# Patient Record
Sex: FEMALE | Race: WHITE | NOT HISPANIC OR LATINO | Employment: OTHER | URBAN - METROPOLITAN AREA
[De-identification: names, ages, dates, MRNs, and addresses within clinical notes are randomized per-mention and may not be internally consistent; named-entity substitution may affect disease eponyms.]

---

## 2017-03-13 ENCOUNTER — TELEPHONE (OUTPATIENT)
Dept: FAMILY MEDICINE | Facility: CLINIC | Age: 67
End: 2017-03-13

## 2017-04-09 DIAGNOSIS — Z76.0 ENCOUNTER FOR MEDICATION REFILL: ICD-10-CM

## 2017-04-10 RX ORDER — ATORVASTATIN CALCIUM 20 MG/1
TABLET, FILM COATED ORAL
Qty: 90 TABLET | Refills: 1 | Status: SHIPPED | OUTPATIENT
Start: 2017-04-10 | End: 2017-04-17

## 2017-04-17 ENCOUNTER — OFFICE VISIT (OUTPATIENT)
Dept: FAMILY MEDICINE | Facility: CLINIC | Age: 67
End: 2017-04-17

## 2017-04-17 VITALS
DIASTOLIC BLOOD PRESSURE: 80 MMHG | HEIGHT: 66 IN | HEART RATE: 73 BPM | SYSTOLIC BLOOD PRESSURE: 136 MMHG | BODY MASS INDEX: 25.55 KG/M2 | WEIGHT: 159 LBS | OXYGEN SATURATION: 98 %

## 2017-04-17 DIAGNOSIS — E78.00 PURE HYPERCHOLESTEROLEMIA: ICD-10-CM

## 2017-04-17 DIAGNOSIS — I10 HYPERTENSION GOAL BP (BLOOD PRESSURE) < 140/80: Primary | ICD-10-CM

## 2017-04-17 DIAGNOSIS — M35.3 PMR (POLYMYALGIA RHEUMATICA) (H): ICD-10-CM

## 2017-04-17 DIAGNOSIS — Z11.59 NEED FOR HEPATITIS C SCREENING TEST: ICD-10-CM

## 2017-04-17 DIAGNOSIS — M85.80 OSTEOPENIA: ICD-10-CM

## 2017-04-17 LAB — ERYTHROCYTE [SEDIMENTATION RATE] IN BLOOD: 18 MM/HR (ref 0–20)

## 2017-04-17 PROCEDURE — 36415 COLL VENOUS BLD VENIPUNCTURE: CPT | Performed by: FAMILY MEDICINE

## 2017-04-17 PROCEDURE — 99214 OFFICE O/P EST MOD 30 MIN: CPT | Performed by: FAMILY MEDICINE

## 2017-04-17 PROCEDURE — 85651 RBC SED RATE NONAUTOMATED: CPT | Performed by: FAMILY MEDICINE

## 2017-04-17 RX ORDER — ATORVASTATIN CALCIUM 20 MG/1
TABLET, FILM COATED ORAL
Qty: 90 TABLET | Refills: 3 | Status: SHIPPED | OUTPATIENT
Start: 2017-04-17 | End: 2018-02-28

## 2017-04-17 RX ORDER — AMLODIPINE BESYLATE 10 MG/1
10 TABLET ORAL DAILY
Qty: 90 TABLET | Refills: 3 | Status: SHIPPED | OUTPATIENT
Start: 2017-04-17 | End: 2018-05-22

## 2017-04-17 NOTE — MR AVS SNAPSHOT
After Visit Summary   4/17/2017    Staci Lopez    MRN: [id number]           Patient Information     Date Of Birth          1950        Visit Information        Provider Department      4/17/2017 8:45 AM Chuy Chiang MD [facility]        Today's Diagnoses     Hypertension goal BP (blood pressure) < 140/80    -  1    Pure hypercholesterolemia        Osteopenia        PMR (polymyalgia rheumatica) (H)        Need for hepatitis C screening test        Encounter for medication refill          Care Instructions    TDaP tetanus   Living with Osteoporosis: Regular Exercise  If you have osteoporosis, exercise is vital for your health. It can prevent bone fractures and spine changes. It will slow bone loss. Exercise will strengthen your body. It can also be fun. A variety of exercises is best. See below for exercises that can help you. Before you start, though, talk to your healthcare provider to be sure these exercises are right for you.    Resistance exercises. These build muscle strength and maintain bone mass. They also make you less prone to injury. Exercises include lifting small weights, doing push-ups and sit-ups, using elastic exercise bands, and using weight machines.  Weight-bearing activities. These help your whole body. They also help you maintain bone mass. Activities include walking, dancing, and housework.  Nonweight-bearing exercises. These help prevent back strain and pain. They do this by building the trunk and leg muscles. Exercises that help with flexibility can prevent falls. Examples include swimming, water exercise, and stretching.  Staying safe  Here are tips to stay safe:     Always check with your healthcare provider before starting any new exercise program.    Use weights only as instructed.    Stop any exercise that causes pain.     7537-7786 The [employer]. [address] [place] [address], [place] [address]. All rights reserved. This information is not  intended as a substitute for professional medical care. Always follow your healthcare professional's instructions.          Preventing Osteoporosis: Meeting Your Calcium Needs  Your body needs calcium to build and repair bones. But it can't make calcium on its own. That's why it's important to eat calcium-rich foods. Some foods are naturally rich in calcium. Others have calcium added (fortified). It's best to get calcium from the foods you eat. But if you can't get enough, you may want to take calcium supplements. To meet your daily calcium needs, try the foods listed below.  Dairy Fish & beans Other sources      Source   Calcium (mg) per serving   Source   Calcium (mg) per serving   Source   Calcium (mg) per serving      Low-fat yogurt, plain   415 mg/8 oz.   Sardines, Atlantic, canned, with bones   351 mg/3 oz.   Oatmeal, instant, fortified   215 mg/1 cup   Nonfat milk   302 mg/1 cup   [place], sockeye, canned, with bones   239 mg/3 oz.   Tofu made with calcium sulfate   204 mg/3 oz.   Low-fat milk   297 mg/1 cup   Soybeans, fresh, boiled   131 mg/1/2 cup   Collards   179 mg/1/2 cup   Swiss cheese   272 mg/1 oz.   White beans, cooked   81 mg/1/2 cup   English muffin, whole wheat   175 mg/1 muffin   Cheddar cheese   205 mg/1 oz.   Navy beans, cooked   79 mg/1/2 cup   Kale   90 mg/1/2 cup   Ice cream strawberry   79 mg/1/2 cup           Orange, navel   56 mg/1 medium   Note: Calcium levels may vary depending on brand and size.  Daily calcium needs  14-18 years old: 1,300 mg  19-30 years old: 1,000 mg  31-50 years old: 1,000 mg  51-70 years old, women: 1,200 mg  51-70 years old, men: 1,000 mg  Pregnant or nursin-28 years old: 1,300 mg, 19-50 years old: 1,000 mg  Older than 70 (women and men): 1,200 mg     7888-1772 [employer]. [address] [place] [address], [place] [address]. All rights reserved. This information is not intended as a substitute for professional medical care. Always follow your healthcare  "professional's instructions.              Follow-ups after your visit        Who to contact     If you have questions or need follow up information about today's clinic visit or your schedule please contact [facility] directly at [phone].  Normal or non-critical lab and imaging results will be communicated to you by MyChart, letter or phone within 4 business days after the clinic has received the results. If you do not hear from us within 7 days, please contact the clinic through Artomatixhart or phone. If you have a critical or abnormal lab result, we will notify you by phone as soon as possible.  Submit refill requests through TaleSpring or call your pharmacy and they will forward the refill request to us. Please allow 3 business days for your refill to be completed.          Additional Information About Your Visit        [employer] Information     TaleSpring lets you send messages to your doctor, view your test results, renew your prescriptions, schedule appointments and more. To sign up, go to [email or web address] . Click on \"Log in\" on the left side of the screen, which will take you to the Welcome page. Then click on \"Sign up Now\" on the right side of the page.     You will be asked to enter the access code listed below, as well as some personal information. Please follow the directions to create your username and password.     Your access code is: [email or web address]  Expires: 2017  9:01 AM     Your access code will  in 90 days. If you need help or a new code, please call your [place] clinic or [phone].        Care EveryWhere ID     This is your Care EveryWhere ID. This could be used by other organizations to access your [place] medical records  [id number]        Your Vitals Were     Pulse Height Pulse Oximetry BMI (Body Mass Index)          73 1.664 m (5' 5.5\") 98% 26.06 kg/m2         Blood Pressure from Last 3 Encounters:   17 136/80   16 135/60   14 124/60    Weight " from Last 3 Encounters:   04/17/17 72.1 kg (159 lb)   02/16/16 70.8 kg (156 lb)   04/17/14 76.4 kg (168 lb 6.4 oz)              We Performed the Following     Comp. Metabolic Panel (14) ([facility])     HCV Antibody ([facility])     Lipid Panel ([facility])     Sed Rate Westergren ([facility])          Today's Medication Changes          These changes are accurate as of: 4/17/17  9:02 AM.  If you have any questions, ask your nurse or doctor.               These medicines have changed or have updated prescriptions.        Dose/Directions    amLODIPine 10 MG tablet   Commonly known as:  NORVASC   This may have changed:  See the new instructions.   Used for:  Encounter for medication refill, Hypertension goal BP (blood pressure) < 140/80   Changed by:  Chuy Chiang MD        Dose:  10 mg   Take 1 tablet (10 mg) by mouth daily   Quantity:  90 tablet   Refills:  3       atorvastatin 20 MG tablet   Commonly known as:  LIPITOR   This may have changed:  See the new instructions.   Used for:  Encounter for medication refill   Changed by:  Chuy Chiang MD        TAKE 1 TABLET BY MOUTH EVERY EVENING   Quantity:  90 tablet   Refills:  3            Where to get your medicines      These medications were sent to [facility] [address] [place] [address] AVE [address] AT  [address] [place] [address] [place] [address] [address] [facility] [address]     Phone:  [phone]     amLODIPine 10 MG tablet    atorvastatin 20 MG tablet                Primary Care Provider Office Phone # Fax #    Chuy Chiang [phone]       [facility] [address]  [place] [address]        Thank you!     Thank you for choosing [facility]  for your care. Our goal is always to provide you with excellent care. Hearing back from our patients is one way we can continue to improve our services. Please take a few minutes to complete the written survey that you may receive in the mail after your visit with us. Thank  you!             Your Updated Medication List - Protect others around you: Learn how to safely use, store and throw away your medicines at www.disposemymeds.org.          This list is accurate as of: 4/17/17  9:02 AM.  Always use your most recent med list.                   Brand Name Dispense Instructions for use    amLODIPine 10 MG tablet    NORVASC    90 tablet    Take 1 tablet (10 mg) by mouth daily       aspirin 81 MG tablet     90 tablet    Take 1 tablet by mouth daily.       atorvastatin 20 MG tablet    LIPITOR    90 tablet    TAKE 1 TABLET BY MOUTH EVERY EVENING       glucosamine chondroitin 1500 complex Caps      Take 1 capsule by mouth daily.       ibuprofen 200 MG capsule      Take 600 mg by mouth 2 times daily.       Multi-vitamin Tabs tablet   Generic drug:  multivitamin, therapeutic with minerals     100 tablet    Take 1 tablet by mouth daily.       SM CALCIUM 500/VITAMIN D3 PO      Take 1 tablet by mouth daily.       vitamin D 1000 UNITS capsule     100 capsule    Take 1 capsule by mouth daily

## 2017-04-17 NOTE — LETTER
[facility] [address] [place]  [address]  Phone: [phone]    April 19, 2017      Staci Lopez  [address] [place] [address]              Dear Staci Small all of your lab results look just fine. The sedimentation rate also is normal.         Sincerely,     Chuy Barger M.D.    Results for orders placed or performed in visit on 04/17/17   Comp. Metabolic Panel (14) ([facility])   Result Value Ref Range    Glucose 96 65 - 99 mg/dL    Urea Nitrogen 22 8 - 27 mg/dL    Creatinine 0.79 0.57 - 1.00 mg/dL    eGFR If NonAfricn Am 78 >59 mL/min/1.73    eGFR If Africn Am 90 >59 mL/min/1.73    BUN/Creatinine Ratio 28 12 - 28    Sodium 145 (H) 134 - 144 mmol/L    Potassium 4.0 3.5 - 5.2 mmol/L    Chloride 103 96 - 106 mmol/L    Total CO2 24 18 - 28 mmol/L    Calcium 9.5 8.7 - 10.3 mg/dL    Protein Total 6.7 6.0 - 8.5 g/dL    Albumin 4.5 3.6 - 4.8 g/dL    Globulin, Total 2.2 1.5 - 4.5 g/dL    A/G Ratio 2.0 1.2 - 2.2    Bilirubin Total 0.8 0.0 - 1.2 mg/dL    Alkaline Phosphatase 105 39 - 117 IU/L    AST 22 0 - 40 IU/L    ALT 18 0 - 32 IU/L    Narrative    Performed at:  01 - [facility] [address], [place], [place]  [id number]  : Mikey Min MD, Phone:  [phone]   Lipid Panel ([facility])   Result Value Ref Range    Cholesterol 214 (H) 100 - 199 mg/dL    Triglycerides 81 0 - 149 mg/dL    HDL Cholesterol 82 >39 mg/dL    VLDL Cholesterol Devon 16 5 - 40 mg/dL    LDL Cholesterol Calculated 116 (H) 0 - 99 mg/dL    LDL/HDL Ratio 1.4 0.0 - 3.2 ratio units    Narrative    Performed at:  01 - [facility] [address], [place], [place]  [id number]  : Mikey Min MD, Phone:  [phone]   HCV Antibody ([facility])   Result Value Ref Range    Hep C Virus Ab <0.1 0.0 - 0.9 s/co ratio    Narrative    Performed at:  01 - [facility] [address], [place], [place]  [id number]  : Mikey Min MD, Phone:  [phone]   Sed Rate [facility]  ([facility])   Result Value Ref Range    Sed Rate 18 0 - 20 mm/hr

## 2017-04-17 NOTE — PROGRESS NOTES
.    Subjective:  Here to discuss the status of the following problem(s):(Unless noted the problem(s) is/are stable and if applicable the medicine(s) being used is/are causing no side effects or problems.)    CC: Hypertension and Recheck Medication      1. Hypertension goal BP (blood pressure) < 140/80  Amlodipine 10 mg only med    2. Pure hypercholesterolemia  atorvastatin 20 mg  No aches  Labs due    3. Osteopenia  dexa in 2016 was slightly worse  At -2.2  Vit d and calcium okay intake    4. PMR (polymyalgia rheumatica) (H)  No longer on steroids has no symptoms at this time      ROS:  4 point ROS completed and negative except noted above, including Gen, CV, Resp, GI,     KNEE PAIN MILD TO MODERATE    Past Medical History:   Diagnosis Date     ACP (advance care planning)     will bring form in 4/1/13     Colon adenoma 2009     Hyperlipidemia LDL goal < 130      Hypertension goal BP (blood pressure) < 140/80      Osteopenia     DEXA -2.0 in July 2013     PMR (polymyalgia rheumatica) (H)     off pred at end of 2015- Dr Peter     Pure hypercholesterolemia      Current Outpatient Prescriptions   Medication     amLODIPine (NORVASC) 10 MG tablet     atorvastatin (LIPITOR) 20 MG tablet     Cholecalciferol (VITAMIN D) 1000 UNITS capsule     Calcium Carbonate-Vitamin D (SM CALCIUM 500/VITAMIN D3 PO)     Multiple Vitamin (MULTI-VITAMIN) per tablet     Glucosamine-Chondroit-Vit C-Mn (GLUCOSAMINE CHONDROITIN 1500 COMPLEX) CAPS     aspirin 81 MG tablet     ibuprofen 200 MG capsule     [DISCONTINUED] atorvastatin (LIPITOR) 20 MG tablet     [DISCONTINUED] amLODIPine (NORVASC) 10 MG tablet     [DISCONTINUED] amLODIPine (NORVASC) 10 MG tablet     No current facility-administered medications for this visit.       reports that she quit smoking about 6 years ago. Her smoking use included Cigarettes. She has never used smokeless tobacco. She reports that she drinks about 4.2 oz of alcohol per week  She reports that she does not use  illicit drugs.      EXAM:  BP: 136/80   Pulse: 73      Wt Readings from Last 2 Encounters:   04/17/17 72.1 kg (159 lb)   02/16/16 70.8 kg (156 lb)       BMI= Body mass index is 26.06 kg/(m^2).    EXAM:   APPEARANCE: = Nrl  Conj/Eyelids = Nrl  Ears/Nose = Nrl  Neck = Nrl  Resp effort = Nrl  Breath Sounds = Nrl  Heart Rate, Rythym, & sounds (no Murm)  = Nrl  Carotid Art's = Nrl  Digits and Nails =Nrl  SKIN absent significant rashes or lesions = Nrl  Recent/Remote Memory = Nrl  Mood/Affect = Nrl    Assessment/Plan:  Staci was seen today for hypertension and recheck medication.    Diagnoses and all orders for this visit:    Hypertension goal BP (blood pressure) < 140/80  -     Comp. Metabolic Panel (14) ([facility])  -     amLODIPine (NORVASC) 10 MG tablet; Take 1 tablet (10 mg) by mouth daily    Pure hypercholesterolemia  -     Comp. Metabolic Panel (14) ([facility])  -     Lipid Panel ([facility])  -     atorvastatin (LIPITOR) 20 MG tablet; TAKE 1 TABLET BY MOUTH EVERY EVENING    Osteopenia  -     Comp. Metabolic Panel (14) ([facility])  REC DEXA IN 2019  DISCUSSED CALCIUM AND VIT D   WEIGHT BEARING EXERCISE    PMR (polymyalgia rheumatica) (H)  -     Sed Rate Westergren ([facility])    Need for hepatitis C screening test  -     HCV Antibody ([facility])              Chuy Chiang  [facility]

## 2017-04-17 NOTE — PATIENT INSTRUCTIONS
TDaP tetanus   Living with Osteoporosis: Regular Exercise  If you have osteoporosis, exercise is vital for your health. It can prevent bone fractures and spine changes. It will slow bone loss. Exercise will strengthen your body. It can also be fun. A variety of exercises is best. See below for exercises that can help you. Before you start, though, talk to your healthcare provider to be sure these exercises are right for you.    Resistance exercises. These build muscle strength and maintain bone mass. They also make you less prone to injury. Exercises include lifting small weights, doing push-ups and sit-ups, using elastic exercise bands, and using weight machines.  Weight-bearing activities. These help your whole body. They also help you maintain bone mass. Activities include walking, dancing, and housework.  Nonweight-bearing exercises. These help prevent back strain and pain. They do this by building the trunk and leg muscles. Exercises that help with flexibility can prevent falls. Examples include swimming, water exercise, and stretching.  Staying safe  Here are tips to stay safe:     Always check with your healthcare provider before starting any new exercise program.    Use weights only as instructed.    Stop any exercise that causes pain.     1541-5134 [employer]. [address] [place] [address]. All rights reserved. This information is not intended as a substitute for professional medical care. Always follow your healthcare professional's instructions.          Preventing Osteoporosis: Meeting Your Calcium Needs  Your body needs calcium to build and repair bones. But it can't make calcium on its own. That's why it's important to eat calcium-rich foods. Some foods are naturally rich in calcium. Others have calcium added (fortified). It's best to get calcium from the foods you eat. But if you can't get enough, you may want to take calcium supplements. To meet your daily calcium needs,  try the foods listed below.  Dairy Fish & beans Other sources      Source   Calcium (mg) per serving   Source   Calcium (mg) per serving   Source   Calcium (mg) per serving      Low-fat yogurt, plain   415 mg/8 oz.   Sardines, Atlantic, canned, with bones   351 mg/3 oz.   Oatmeal, instant, fortified   215 mg/1 cup   Nonfat milk   302 mg/1 cup   [place], sockeye, canned, with bones   239 mg/3 oz.   Tofu made with calcium sulfate   204 mg/3 oz.   Low-fat milk   297 mg/1 cup   Soybeans, fresh, boiled   131 mg/1/2 cup   Collards   179 mg/1/2 cup   Swiss cheese   272 mg/1 oz.   White beans, cooked   81 mg/1/2 cup   English muffin, whole wheat   175 mg/1 muffin   Cheddar cheese   205 mg/1 oz.   Navy beans, cooked   79 mg/1/2 cup   Kale   90 mg/1/2 cup   Ice cream strawberry   79 mg/1/2 cup           Orange, navel   56 mg/1 medium   Note: Calcium levels may vary depending on brand and size.  Daily calcium needs  14-18 years old: 1,300 mg  19-30 years old: 1,000 mg  31-50 years old: 1,000 mg  51-70 years old, women: 1,200 mg  51-70 years old, men: 1,000 mg  Pregnant or nursin-28 years old: 1,300 mg, 19-50 years old: 1,000 mg  Older than 70 (women and men): 1,200 mg     6581-2127 [employer]. [address] [place] [address], [place] [address]. All rights reserved. This information is not intended as a substitute for professional medical care. Always follow your healthcare professional's instructions.

## 2017-04-18 LAB
ALBUMIN SERPL-MCNC: 4.5 G/DL (ref 3.6–4.8)
ALBUMIN/GLOB SERPL: 2 {RATIO} (ref 1.2–2.2)
ALP SERPL-CCNC: 105 IU/L (ref 39–117)
ALT SERPL-CCNC: 18 IU/L (ref 0–32)
AST SERPL-CCNC: 22 IU/L (ref 0–40)
BILIRUB SERPL-MCNC: 0.8 MG/DL (ref 0–1.2)
BUN SERPL-MCNC: 22 MG/DL (ref 8–27)
BUN/CREATININE RATIO: 28 (ref 12–28)
CALCIUM SERPL-MCNC: 9.5 MG/DL (ref 8.7–10.3)
CHLORIDE SERPLBLD-SCNC: 103 MMOL/L (ref 96–106)
CHOLEST SERPL-MCNC: 214 MG/DL (ref 100–199)
CREAT SERPL-MCNC: 0.79 MG/DL (ref 0.57–1)
EGFR IF AFRICN AM: 90 ML/MIN/1.73
EGFR IF NONAFRICN AM: 78 ML/MIN/1.73
GLOBULIN, TOTAL: 2.2 G/DL (ref 1.5–4.5)
GLUCOSE SERPL-MCNC: 96 MG/DL (ref 65–99)
HCV AB SERPL QL IA: <0.1 S/CO RATIO (ref 0–0.9)
HDLC SERPL-MCNC: 82 MG/DL
LDL/HDL RATIO: 1.4 RATIO UNITS (ref 0–3.2)
LDLC SERPL CALC-MCNC: 116 MG/DL (ref 0–99)
POTASSIUM SERPL-SCNC: 4 MMOL/L (ref 3.5–5.2)
PROT SERPL-MCNC: 6.7 G/DL (ref 6–8.5)
SODIUM SERPL-SCNC: 145 MMOL/L (ref 134–144)
TOTAL CO2: 24 MMOL/L (ref 18–28)
TRIGL SERPL-MCNC: 81 MG/DL (ref 0–149)
VLDLC SERPL CALC-MCNC: 16 MG/DL (ref 5–40)

## 2017-09-27 DIAGNOSIS — Z76.0 ENCOUNTER FOR MEDICATION REFILL: ICD-10-CM

## 2017-09-27 RX ORDER — ATORVASTATIN CALCIUM 20 MG/1
TABLET, FILM COATED ORAL
Qty: 90 TABLET | Refills: 0 | Status: SHIPPED | OUTPATIENT
Start: 2017-09-27 | End: 2018-06-05

## 2017-10-19 ENCOUNTER — TRANSFERRED RECORDS (OUTPATIENT)
Dept: FAMILY MEDICINE | Facility: CLINIC | Age: 67
End: 2017-10-19

## 2018-02-28 ENCOUNTER — OFFICE VISIT (OUTPATIENT)
Dept: FAMILY MEDICINE | Facility: CLINIC | Age: 68
End: 2018-02-28

## 2018-02-28 VITALS
WEIGHT: 149.2 LBS | HEART RATE: 86 BPM | OXYGEN SATURATION: 99 % | DIASTOLIC BLOOD PRESSURE: 70 MMHG | SYSTOLIC BLOOD PRESSURE: 139 MMHG | BODY MASS INDEX: 24.45 KG/M2 | RESPIRATION RATE: 16 BRPM

## 2018-02-28 DIAGNOSIS — J00 ACUTE RHINITIS, UNSPECIFIED TYPE: Primary | ICD-10-CM

## 2018-02-28 PROCEDURE — 99213 OFFICE O/P EST LOW 20 MIN: CPT | Performed by: FAMILY MEDICINE

## 2018-02-28 RX ORDER — SULFAMETHOXAZOLE/TRIMETHOPRIM 800-160 MG
1 TABLET ORAL 2 TIMES DAILY
Qty: 14 TABLET | Refills: 0 | Status: SHIPPED | OUTPATIENT
Start: 2018-02-28 | End: 2018-03-14

## 2018-02-28 NOTE — PROGRESS NOTES
2-3 months of nasal congestion and drainage, occasionally purulent. No fever or chills. No cough.   OBJECTIVE: /70  Pulse 86  Resp 16  Wt 67.7 kg (149 lb 3.2 oz)  SpO2 99%  BMI 24.45 kg/m2 NAD turbinates red and irritated, slight inflammation of the skin of the nostrils. Oropharynx, nodes and lungs OK.  (J00) Acute rhinitis, unspecified type  (primary encounter diagnosis)  Comment:   Plan: mupirocin (BACTROBAN NASAL) 2 % nasal ointment,        sulfamethoxazole-trimethoprim (BACTRIM         DS/SEPTRA DS) 800-160 MG per tablet

## 2018-02-28 NOTE — MR AVS SNAPSHOT
"              After Visit Summary   2018    Staci Lopez    MRN: [id number]           Patient Information     Date Of Birth          1950        Visit Information        Provider Department      2018 4:15 PM Ace Khan MD [facility]         Follow-ups after your visit        Your next 10 appointments already scheduled     2018  4:15 PM CST   SHORT with Ace Khan MD   [facility]    [address] [place] [address] [phone]              Who to contact     If you have questions or need follow up information about today's clinic visit or your schedule please contact [facility] directly at [phone].  Normal or non-critical lab and imaging results will be communicated to you by Platialhart, letter or phone within 4 business days after the clinic has received the results. If you do not hear from us within 7 days, please contact the clinic through Platialhart or phone. If you have a critical or abnormal lab result, we will notify you by phone as soon as possible.  Submit refill requests through Bonfyre or call your pharmacy and they will forward the refill request to us. Please allow 3 business days for your refill to be completed.          Additional Information About Your Visit        [employer] Information     Bonfyre lets you send messages to your doctor, view your test results, renew your prescriptions, schedule appointments and more. To sign up, go to [email or web address] . Click on \"Log in\" on the left side of the screen, which will take you to the Welcome page. Then click on \"Sign up Now\" on the right side of the page.     You will be asked to enter the access code listed below, as well as some personal information. Please follow the directions to create your username and password.     Your access code is: [email or web address]  Expires: 2018  4:12 PM     Your access code will  in 90 days. " If you need help or a new code, please call your [place] clinic or [phone].        Care EveryWhere ID     This is your Care EveryWhere ID. This could be used by other organizations to access your [place] medical records  [id number]        Your Vitals Were     Pulse Respirations Pulse Oximetry BMI (Body Mass Index)          86 16 99% 24.45 kg/m2         Blood Pressure from Last 3 Encounters:   02/28/18 139/70   04/17/17 136/80   02/16/16 135/60    Weight from Last 3 Encounters:   02/28/18 67.7 kg (149 lb 3.2 oz)   04/17/17 72.1 kg (159 lb)   02/16/16 70.8 kg (156 lb)              Today, you had the following     No orders found for display       Primary Care Provider Office Phone # Fax #    Kaitlynn Lanza [phone] [address] [facility] [address]        Equal Access to Services     : Hadii aad ku hadasho Soomaali, waaxda luqadaha, qaybta kaalmada adeegyada, waxay idiin hayaan ykle lawson . So [facility] [phone].    ATENCIÓN: Si habla español, tiene a smith disposición servicios gratuitos de asistencia lingüística. Llame al [phone].    We comply with applicable federal civil rights laws and [place] laws. We do not discriminate on the basis of race, color, national origin, age, disability, sex, sexual orientation, or gender identity.            Thank you!     Thank you for choosing [facility]  for your care. Our goal is always to provide you with excellent care. Hearing back from our patients is one way we can continue to improve our services. Please take a few minutes to complete the written survey that you may receive in the mail after your visit with us. Thank you!             Your Updated Medication List - Protect others around you: Learn how to safely use, store and throw away your medicines at www.disposemymeds.org.          This list is accurate as of 2/28/18  4:12 PM.  Always use your most recent med list.                   Brand  Name Dispense Instructions for use Diagnosis    amLODIPine 10 MG tablet    NORVASC    90 tablet    Take 1 tablet (10 mg) by mouth daily    Hypertension goal BP (blood pressure) < 140/80       aspirin 81 MG tablet     90 tablet    Take 1 tablet by mouth daily.    Routine general medical examination at a health care facility       * atorvastatin 20 MG tablet    LIPITOR    90 tablet    TAKE 1 TABLET BY MOUTH EVERY EVENING    Pure hypercholesterolemia       * atorvastatin 20 MG tablet    LIPITOR    90 tablet    TAKE 1 TABLET BY MOUTH EVERY EVENING    Encounter for medication refill       glucosamine chondroitin 1500 complex Caps      Take 1 capsule by mouth daily.    Routine general medical examination at a health care facility       ibuprofen 200 MG capsule      Take 600 mg by mouth 2 times daily.        Multi-vitamin Tabs tablet   Generic drug:  multivitamin, therapeutic with minerals     100 tablet    Take 1 tablet by mouth daily.    Routine general medical examination at a health care facility       SM CALCIUM 500/VITAMIN D3 PO      Take 1 tablet by mouth daily.        vitamin D 1000 UNITS capsule     100 capsule    Take 1 capsule by mouth daily    PMR (polymyalgia rheumatica) (H)       * Notice:  This list has 2 medication(s) that are the same as other medications prescribed for you. Read the directions carefully, and ask your doctor or other care provider to review them with you.

## 2018-03-01 RX ORDER — MUPIROCIN 20 MG/G
OINTMENT TOPICAL
Qty: 10 G | Refills: 0 | COMMUNITY
Start: 2018-03-01 | End: 2018-03-14

## 2018-03-01 NOTE — PROGRESS NOTES
Received call from the pharmacy on 2/28/18.  The bactroban nasal ointment is very expensive.  Dr. Khan agreed to change it to the bactroban tube.

## 2018-03-14 ENCOUNTER — TELEPHONE (OUTPATIENT)
Dept: FAMILY MEDICINE | Facility: CLINIC | Age: 68
End: 2018-03-14

## 2018-03-14 NOTE — TELEPHONE ENCOUNTER
Patient calls regarding Bactrim prescribed for sinus infection 2/28/18. Finished med 3/7/18 and on 3/9 woke in morning with rash neck to ankles. Flat, non-itchy, splotchy rash. Rash has nearly resolved now. Wonders if due to medication or other reason - no new products used that she would be suspicious of.   Sinus symptoms much improved as well.   Plan: per Dr. Khan - rash likely due to sulfa antibiotics and medication added to EPIC allergy list. Patient advised to document this also and avoid use of sulfa antibiotics in future.   Lorene Quiroga RN

## 2018-05-22 DIAGNOSIS — I10 HYPERTENSION GOAL BP (BLOOD PRESSURE) < 140/80: ICD-10-CM

## 2018-05-22 RX ORDER — AMLODIPINE BESYLATE 10 MG/1
10 TABLET ORAL DAILY
Qty: 30 TABLET | Refills: 0 | Status: SHIPPED | OUTPATIENT
Start: 2018-05-22 | End: 2018-06-05

## 2018-05-23 NOTE — TELEPHONE ENCOUNTER
I have left a message on the home number for the patient to call our office to set up an appointment.    Dedrick Lzoada,   [facility]  [phone]

## 2018-05-29 NOTE — PROGRESS NOTES
SUBJECTIVE:   Staci Lopez is a 67 year old female who presents to clinic today for the following health issues    MEDICATION CHECK    Medication Followup of Amlodipine    Taking Medication as prescribed: yes    Side Effects:  None    Medication Helping Symptoms:  yes     BP Readings from Last 3 Encounters:   06/05/18 126/78   02/28/18 139/70   04/17/17 136/80     Last Basic Metabolic Panel:  Lab Results   Component Value Date     06/05/2018      Lab Results   Component Value Date    POTASSIUM 3.9 06/05/2018     Lab Results   Component Value Date    CHLORIDE 99 06/05/2018     Lab Results   Component Value Date    LORENZO 9.4 06/05/2018     No results found for: CO2  Lab Results   Component Value Date    BUN 18 06/05/2018    BUN 21 06/05/2018     Lab Results   Component Value Date    CR 0.86 06/05/2018     Lab Results   Component Value Date     06/05/2018         Dexa 2016  IMPRESSION:  1. Advanced osteopenia in the femoral neck bilaterally. Borderline  osteopenia in the lumbar spine.  2. The probability of major osteoporotic fracture is 18.7 percent and  probability of hip fracture is 3.8 percent within the next 10 years  according to FRAX risk assessment.     DANITA YOUSIF MD    2015 Colonoscopy due in 2020    Sleep ok, Nocturia 1-2  Appetite ok  Typical breakfast yogurt and fruit and coffee, occasional english muffine  Typical lunch salad or sandwich, water  Typical dinner protein pork, fish, chicken with veggie or bigger salad  Snacks: rare nuts  Exercise bike, garden    Travel [place] in March (reviewed CDC.gov traveler's health recommendations)  Exposure no    Smoking no  ETOH 1 per day average wine  Street drugs/MJ no  Caffeine yes    Depression no (20-thirty years ago)  Anxiety no  Panic no  SI/SP no  Hallucinations no  Paranoid no  Manic no  OCD no  PTSD no  Gambling no  Guns no  Abuse no  Eating disorder no    Retired from Insurance work  Reading, lunch with friend, travel     ROS: 10 point  ROS neg other than the symptoms noted above in the HPI.  /78  Pulse 78  Resp 16  Wt 64.9 kg (143 lb)  BMI 23.43 kg/m2  Normal exam  Left cerumen- successful ear lavage on inspection  Exam:  Constitutional: healthy, alert and no distress  Head: Normocephalic. No masses, lesions, tenderness or abnormalities  Neck: Neck supple. No adenopathy. Thyroid symmetric, normal size,, Carotids without bruits.  ENT: ENT exam normal, no neck nodes or sinus tenderness   Cardiovascular: negative, PMI normal. No lifts, heaves, or thrills. RRR. No murmurs, clicks gallops or rub  Respiratory: negative, Percussion normal. Good diaphragmatic excursion. Lungs clear  Gastrointestinal: Abdomen soft, non-tender. BS normal. No masses, organomegaly  Breast/rectal/: Deferred  Musculoskeletal: extremities normal- no gross deformities noted, gait normal and normal muscle tone h/o PMR  Skin: no suspicious lesions or rashes  Neurologic: Gait normal. Reflexes normal and symmetric. Sensation grossly WNL.  Psychiatric: mentation appears normal and affect normal/bright  Hematologic/Lymphatic/Immunologic: Normal cervical lymph nodes      Problem list and histories reviewed & adjusted, as indicated.  Additional history: as documented    Patient Active Problem List   Diagnosis     Pure hypercholesterolemia     Hypertension goal BP (blood pressure) < 140/80     Hip pain     PMR (polymyalgia rheumatica) (H)     ACP (advance care planning)     Osteopenia     Past Surgical History:   Procedure Laterality Date     BREAST BIOPSY, RT/LT      x 2 - benign       Social History   Substance Use Topics     Smoking status: Former Smoker     Types: Cigarettes     Quit date: 11/1/2010     Smokeless tobacco: Never Used     Alcohol use 4.2 oz/week     7 Glasses of wine per week     Family History   Problem Relation Age of Onset     Arthritis Mother      Neurologic Disorder Paternal Grandmother      bipolar     High cholesterol Brother      High cholesterol  "Brother      High cholesterol Brother      Other - See Comments Father      fall chest full on blood after fall in the hospital 6/5/18     Hyperlipidemia Sister      breast cancer     CANCER Maternal Grandmother      Parkinsonism Maternal Grandfather          Current Outpatient Prescriptions   Medication Sig Dispense Refill     amLODIPine (NORVASC) 10 MG tablet Take 1 tablet (10 mg) by mouth daily 90 tablet 3     aspirin 81 MG tablet Take 1 tablet by mouth daily. 90 tablet 3     atorvastatin (LIPITOR) 20 MG tablet Take 1 tablet (20 mg) by mouth daily 90 tablet 3     Calcium Carbonate-Vitamin D (SM CALCIUM 500/VITAMIN D3 PO) Take 1 tablet by mouth daily.       Glucosamine-Chondroit-Vit C-Mn (GLUCOSAMINE CHONDROITIN 1500 COMPLEX) CAPS Take 1 capsule by mouth daily.       ibuprofen 200 MG capsule Take 600 mg by mouth 2 times daily.       Multiple Vitamin (MULTI-VITAMIN) per tablet Take 1 tablet by mouth daily. 100 tablet 3     Cholecalciferol (VITAMIN D) 1000 UNITS capsule Take 1 capsule by mouth daily (Patient not taking: Reported on 6/5/2018) 100 capsule 12     Allergies   Allergen Reactions     Ace Inhibitors      Cough       Sulfamethoxazole W/Trimethoprim Rash     Rash       Recent Labs   Lab Test  06/05/18   0941  04/17/17   0938  02/09/16   1011   LDL  89  116*  91   HDL  97  82  78   TRIG  60  81  86   ALT  15  18  19   CR  0.86  0.79  0.59   POTASSIUM  3.9  4.0  3.9      BP Readings from Last 3 Encounters:   06/05/18 126/78   02/28/18 139/70   04/17/17 136/80    Wt Readings from Last 3 Encounters:   06/05/18 64.9 kg (143 lb)   02/28/18 67.7 kg (149 lb 3.2 oz)   04/17/17 72.1 kg (159 lb)       Estimated body mass index is 23.43 kg/(m^2) as calculated from the following:    Height as of 4/17/17: 1.664 m (5' 5.5\").    Weight as of this encounter: 64.9 kg (143 lb).             Labs reviewed in EPIC    Reviewed and updated as needed this visit by clinical staff       Reviewed and updated as needed this visit by " Provider             Diagnostic Test Results:  Results for orders placed or performed in visit on 06/05/18   Lipid Panel ([facility])   Result Value Ref Range    Cholesterol 198 100 - 199 mg/dL    Triglycerides 60 0 - 149 mg/dL    HDL Cholesterol 97 >39 mg/dL    VLDL Cholesterol Devon 12 5 - 40 mg/dL    LDL Cholesterol Calculated 89 0 - 99 mg/dL    LDL/HDL Ratio 0.9 0.0 - 3.2 ratio    Narrative    Performed at:  01 - [facility] [address], [place], [place]  [id number]  : Mikey Min MD, Phone:  [phone]   Comp. Metabolic Panel (14) ([facility])   Result Value Ref Range    Glucose 101 (H) 65 - 99 mg/dL    Urea Nitrogen 18 8 - 27 mg/dL    Creatinine 0.86 0.57 - 1.00 mg/dL    eGFR If NonAfricn Am 70 >59 mL/min/1.73    eGFR If Africn Am 81 >59 mL/min/1.73    BUN/Creatinine Ratio 21 12 - 28    Sodium 141 134 - 144 mmol/L    Potassium 3.9 3.5 - 5.2 mmol/L    Chloride 99 96 - 106 mmol/L    Total CO2 28 18 - 28 mmol/L    Calcium 9.4 8.7 - 10.3 mg/dL    Protein Total 7.1 6.0 - 8.5 g/dL    Albumin 4.4 3.6 - 4.8 g/dL    Globulin, Total 2.7 1.5 - 4.5 g/dL    A/G Ratio 1.6 1.2 - 2.2    Bilirubin Total 0.6 0.0 - 1.2 mg/dL    Alkaline Phosphatase 106 39 - 117 IU/L    AST 19 0 - 40 IU/L    ALT 15 0 - 32 IU/L    Narrative    Performed at:  01 - [facility] [address], [place], [place]  [id number]  : Mikey Min MD, Phone:  [phone]       ASSESSMENT/PLAN:   ASSESSMENT / PLAN:  (Z76.0) Encounter for medication refill  (primary encounter diagnosis)  Comment:   Plan: atorvastatin (LIPITOR) 20 MG tablet            (I10) Hypertension goal BP (blood pressure) < 140/80  Comment:   BP Readings from Last 3 Encounters:   06/05/18 126/78   02/28/18 139/70   04/17/17 136/80       Plan: amLODIPine (NORVASC) 10 MG tablet, Comp.         Metabolic Panel (14) ([facility]), CANCELED: Basic        Metabolic Panel (8) ([facility])            (H61.22) Impacted cerumen of left ear  Comment:   Plan: Removal Impacted Cerumen  Irrigation Unilateral         (Ear Wash)            (M35.3) PMR (polymyalgia rheumatica) (H)  Comment:   Plan: per rheumatology    (M85.80) Osteopenia, unspecified location  Comment:   Plan: Continue cares    (E78.00) Pure hypercholesterolemia  Comment:   Plan: Lipid Panel ([facility])                Patient Instructions   Tetanus Tdap, Shingrix (2 step)Check with insurance about coverage and PPSV23 pneumonia vaccine.     Labs today  Refills done    Left ear wash    DEXA next year  Colonscopy 2020  Mammogram in October      Kaitlynn Lanza MD  [facility]

## 2018-06-05 ENCOUNTER — OFFICE VISIT (OUTPATIENT)
Dept: FAMILY MEDICINE | Facility: CLINIC | Age: 68
End: 2018-06-05

## 2018-06-05 VITALS
HEART RATE: 78 BPM | SYSTOLIC BLOOD PRESSURE: 126 MMHG | BODY MASS INDEX: 23.43 KG/M2 | DIASTOLIC BLOOD PRESSURE: 78 MMHG | RESPIRATION RATE: 16 BRPM | WEIGHT: 143 LBS

## 2018-06-05 DIAGNOSIS — M35.3 PMR (POLYMYALGIA RHEUMATICA) (H): ICD-10-CM

## 2018-06-05 DIAGNOSIS — H61.22 IMPACTED CERUMEN OF LEFT EAR: ICD-10-CM

## 2018-06-05 DIAGNOSIS — E78.00 PURE HYPERCHOLESTEROLEMIA: ICD-10-CM

## 2018-06-05 DIAGNOSIS — Z76.0 ENCOUNTER FOR MEDICATION REFILL: Primary | ICD-10-CM

## 2018-06-05 DIAGNOSIS — I10 HYPERTENSION GOAL BP (BLOOD PRESSURE) < 140/80: ICD-10-CM

## 2018-06-05 DIAGNOSIS — M85.80 OSTEOPENIA, UNSPECIFIED LOCATION: ICD-10-CM

## 2018-06-05 PROCEDURE — 69209 REMOVE IMPACTED EAR WAX UNI: CPT | Mod: LT | Performed by: FAMILY MEDICINE

## 2018-06-05 PROCEDURE — 99214 OFFICE O/P EST MOD 30 MIN: CPT | Mod: 25 | Performed by: FAMILY MEDICINE

## 2018-06-05 RX ORDER — AMLODIPINE BESYLATE 10 MG/1
10 TABLET ORAL DAILY
Qty: 90 TABLET | Refills: 3 | Status: SHIPPED | OUTPATIENT
Start: 2018-06-05 | End: 2019-06-14

## 2018-06-05 RX ORDER — ATORVASTATIN CALCIUM 20 MG/1
20 TABLET, FILM COATED ORAL DAILY
Qty: 90 TABLET | Refills: 3 | Status: SHIPPED | OUTPATIENT
Start: 2018-06-05 | End: 2020-07-21

## 2018-06-05 NOTE — PATIENT INSTRUCTIONS
Tetanus Tdap, Shingrix (2 step)Check with insurance about coverage and PPSV23 pneumonia vaccine.     Labs today  Refills done    Left ear wash    DEXA next year  Colonscopy 2020  Mammogram in October

## 2018-06-05 NOTE — LETTER
[facility] [address] [place]  [address]  Phone: [phone]    June 11, 2018      Staci Lopez  [address] [place] [address]              Dear Staci,    The results from your recent visit showed Your lipid panel results were good.  CMP was ok, mild elevated sugar, otherwise fine.        Sincerely,     Kaitlynn Lanza M.D.    Results for orders placed or performed in visit on 06/05/18   Lipid Panel ([facility])   Result Value Ref Range    Cholesterol 198 100 - 199 mg/dL    Triglycerides 60 0 - 149 mg/dL    HDL Cholesterol 97 >39 mg/dL    VLDL Cholesterol Devon 12 5 - 40 mg/dL    LDL Cholesterol Calculated 89 0 - 99 mg/dL    LDL/HDL Ratio 0.9 0.0 - 3.2 ratio    Narrative    Performed at:  01 - [facility] [address], [place], [place]  [id number]  : Mikey Min MD, Phone:  [phone]   Comp. Metabolic Panel (14) ([facility])   Result Value Ref Range    Glucose 101 (H) 65 - 99 mg/dL    Urea Nitrogen 18 8 - 27 mg/dL    Creatinine 0.86 0.57 - 1.00 mg/dL    eGFR If NonAfricn Am 70 >59 mL/min/1.73    eGFR If Africn Am 81 >59 mL/min/1.73    BUN/Creatinine Ratio 21 12 - 28    Sodium 141 134 - 144 mmol/L    Potassium 3.9 3.5 - 5.2 mmol/L    Chloride 99 96 - 106 mmol/L    Total CO2 28 18 - 28 mmol/L    Calcium 9.4 8.7 - 10.3 mg/dL    Protein Total 7.1 6.0 - 8.5 g/dL    Albumin 4.4 3.6 - 4.8 g/dL    Globulin, Total 2.7 1.5 - 4.5 g/dL    A/G Ratio 1.6 1.2 - 2.2    Bilirubin Total 0.6 0.0 - 1.2 mg/dL    Alkaline Phosphatase 106 39 - 117 IU/L    AST 19 0 - 40 IU/L    ALT 15 0 - 32 IU/L    Narrative    Performed at:  01 - [facility]  [employer] [place], [place]  [id number]  : Mikey Min MD, Phone:  [phone]

## 2018-06-06 LAB
ALBUMIN SERPL-MCNC: 4.4 G/DL (ref 3.6–4.8)
ALBUMIN/GLOB SERPL: 1.6 {RATIO} (ref 1.2–2.2)
ALP SERPL-CCNC: 106 IU/L (ref 39–117)
ALT SERPL-CCNC: 15 IU/L (ref 0–32)
AST SERPL-CCNC: 19 IU/L (ref 0–40)
BILIRUB SERPL-MCNC: 0.6 MG/DL (ref 0–1.2)
BUN SERPL-MCNC: 18 MG/DL (ref 8–27)
BUN/CREATININE RATIO: 21 (ref 12–28)
CALCIUM SERPL-MCNC: 9.4 MG/DL (ref 8.7–10.3)
CHLORIDE SERPLBLD-SCNC: 99 MMOL/L (ref 96–106)
CHOLEST SERPL-MCNC: 198 MG/DL (ref 100–199)
CREAT SERPL-MCNC: 0.86 MG/DL (ref 0.57–1)
EGFR IF AFRICN AM: 81 ML/MIN/1.73
EGFR IF NONAFRICN AM: 70 ML/MIN/1.73
GLOBULIN, TOTAL: 2.7 G/DL (ref 1.5–4.5)
GLUCOSE SERPL-MCNC: 101 MG/DL (ref 65–99)
HDLC SERPL-MCNC: 97 MG/DL
LDL/HDL RATIO: 0.9 RATIO (ref 0–3.2)
LDLC SERPL CALC-MCNC: 89 MG/DL (ref 0–99)
POTASSIUM SERPL-SCNC: 3.9 MMOL/L (ref 3.5–5.2)
PROT SERPL-MCNC: 7.1 G/DL (ref 6–8.5)
SODIUM SERPL-SCNC: 141 MMOL/L (ref 134–144)
TOTAL CO2: 28 MMOL/L (ref 18–28)
TRIGL SERPL-MCNC: 60 MG/DL (ref 0–149)
VLDLC SERPL CALC-MCNC: 12 MG/DL (ref 5–40)

## 2019-06-14 DIAGNOSIS — I10 HYPERTENSION GOAL BP (BLOOD PRESSURE) < 140/80: ICD-10-CM

## 2019-06-14 RX ORDER — AMLODIPINE BESYLATE 10 MG/1
TABLET ORAL
Qty: 30 TABLET | Refills: 0 | Status: SHIPPED | OUTPATIENT
Start: 2019-06-14 | End: 2019-06-14

## 2019-06-14 NOTE — TELEPHONE ENCOUNTER
amLODIPine  LOV 6/5/18  Last lab 6/5/18    BP Readings from Last 3 Encounters:   06/05/18 126/78   02/28/18 139/70   04/17/17 136/80     Last Comprehensive Metabolic Panel:  Sodium   Date Value Ref Range Status   06/05/2018 141 134 - 144 mmol/L Final     Potassium   Date Value Ref Range Status   06/05/2018 3.9 3.5 - 5.2 mmol/L Final     Chloride   Date Value Ref Range Status   06/05/2018 99 96 - 106 mmol/L Final     Glucose   Date Value Ref Range Status   06/05/2018 101 (H) 65 - 99 mg/dL Final     Urea Nitrogen   Date Value Ref Range Status   06/05/2018 18 8 - 27 mg/dL Final     BUN/Creatinine Ratio   Date Value Ref Range Status   06/05/2018 21 12 - 28 Final     Creatinine   Date Value Ref Range Status   06/05/2018 0.86 0.57 - 1.00 mg/dL Final     Calcium   Date Value Ref Range Status   06/05/2018 9.4 8.7 - 10.3 mg/dL Final

## 2019-07-05 NOTE — PROGRESS NOTES
Medication review and labs    Subjective     Staci Lopez is a 68 year old female who presents to clinic today for the following health issues:  White female glasses  [place], [place]    Sleep average sleep  Appetite ok  Exercise garden, play with grandchild, bike    Smoking no  ETOH social, last drink  [place]  Street drugs/MJ no  Caffeine yes    Fall/Seizure/LOC no    Travel plans no  Exposure grandson had cold last week    Mood ok  Memory ok    IMPRESSION:  1. Advanced osteopenia in the femoral neck bilaterally. Borderline  osteopenia in the lumbar spine.  2. The probability of major osteoporotic fracture is 18.7 percent and  probability of hip fracture is 3.8 percent within the next 10 years  according to FRAX risk assessment.     DANITA YOUSIF MD    Arthritis hands, neck, shoulder,   Stiffness in am 1/2 hour  Alleve prn healing          Patient Active Problem List   Diagnosis     Pure hypercholesterolemia     Hypertension goal BP (blood pressure) < 140/80     Hip pain     PMR (polymyalgia rheumatica) (H)     ACP (advance care planning)     Osteopenia     Past Surgical History:   Procedure Laterality Date     BREAST BIOPSY, RT/LT      x 2 - benign       Social History     Tobacco Use     Smoking status: Former Smoker     Types: Cigarettes     Last attempt to quit: 2010     Years since quittin.6     Smokeless tobacco: Never Used   Substance Use Topics     Alcohol use: Yes     Alcohol/week: 4.2 oz     Types: 7 Glasses of wine per week     Family History   Problem Relation Age of Onset     Arthritis Mother      Neurologic Disorder Paternal Grandmother         bipolar     High cholesterol Brother      High cholesterol Brother      High cholesterol Brother      Other - See Comments Father         fall chest full on blood after fall in the hospital 18     Hyperlipidemia Sister         breast cancer     Cancer Maternal Grandmother      Parkinsonism Maternal Grandfather          Current  Outpatient Medications   Medication Sig Dispense Refill     amLODIPine (NORVASC) 10 MG tablet TAKE 1 TABLET BY MOUTH DAILY. LAST REFILL. PATIENT NEEDS TO BE SEEN AND HAVE LABS. 30 tablet 0     aspirin 81 MG tablet Take 1 tablet by mouth daily. 90 tablet 3     atorvastatin (LIPITOR) 20 MG tablet TAKE 1 TABLET BY MOUTH EVERY EVENING 90 tablet 3     atorvastatin (LIPITOR) 20 MG tablet Take 1 tablet (20 mg) by mouth daily 90 tablet 3     Calcium Carbonate-Vitamin D (SM CALCIUM 500/VITAMIN D3 PO) Take 1 tablet by mouth daily.       Cholecalciferol (VITAMIN D) 1000 UNITS capsule Take 1 capsule by mouth daily (Patient not taking: Reported on 6/5/2018) 100 capsule 12     Glucosamine-Chondroit-Vit C-Mn (GLUCOSAMINE CHONDROITIN 1500 COMPLEX) CAPS Take 1 capsule by mouth daily.       ibuprofen 200 MG capsule Take 600 mg by mouth 2 times daily.       Multiple Vitamin (MULTI-VITAMIN) per tablet Take 1 tablet by mouth daily. 100 tablet 3     Allergies   Allergen Reactions     Ace Inhibitors      Cough       Sulfamethoxazole W/Trimethoprim Rash     Rash       Recent Labs   Lab Test 06/05/18  0941 04/17/17  0938 02/09/16  1011   LDL 89 116* 91   HDL 97 82 78   TRIG 60 81 86   ALT 15 18 19   CR 0.86 0.79 0.59   POTASSIUM 3.9 4.0 3.9      BP Readings from Last 3 Encounters:   07/10/19 118/74   06/05/18 126/78   02/28/18 139/70    Wt Readings from Last 3 Encounters:   07/10/19 64 kg (141 lb)   06/05/18 64.9 kg (143 lb)   02/28/18 67.7 kg (149 lb 3.2 oz)                      Reviewed and updated as needed this visit by Provider         Review of Systems   ROS COMP: Constitutional, HEENT, cardiovascular, pulmonary, GI, , musculoskeletal, neuro, skin, endocrine and psych systems are negative, except as otherwise noted.      Objective    /74   Pulse 58   Resp 16   Wt 64 kg (141 lb)   SpO2 97%   BMI 23.11 kg/m    There is no height or weight on file to calculate BMI.  Physical Exam   GENERAL: healthy, alert and no  distress  EYES: Eyes grossly normal to inspection, PERRL and conjunctivae and sclerae normal  HENT: ear canals and TM's normal, nose and mouth without ulcers or lesions  NECK: no adenopathy, no asymmetry, masses, or scars and thyroid normal to palpation  RESP: lungs clear to auscultation - no rales, rhonchi or wheezes  CV: regular rate and rhythm, normal S1 S2, no S3 or S4, no murmur, click or rub, no peripheral edema and peripheral pulses strong  ABDOMEN: soft, nontender, no hepatosplenomegaly, no masses and bowel sounds normal  MS: no gross musculoskeletal defects noted, no edema H/o PMR  SKIN: no suspicious lesions or rashes  NEURO: Normal strength and tone, mentation intact and speech normal  PSYCH: mentation appears normal, affect normal/bright  LYMPH: no cervical, supraclavicular, axillary, or inguinal adenopathy    Diagnostic Test Results:  Labs reviewed in Epic  Results for orders placed or performed in visit on 06/05/18   Lipid Panel ([facility])   Result Value Ref Range    Cholesterol 198 100 - 199 mg/dL    Triglycerides 60 0 - 149 mg/dL    HDL Cholesterol 97 >39 mg/dL    VLDL Cholesterol Devon 12 5 - 40 mg/dL    LDL Cholesterol Calculated 89 0 - 99 mg/dL    LDL/HDL Ratio 0.9 0.0 - 3.2 ratio    Narrative    Performed at:  01 - [facility] [address], [place], [place]  [id number]  : Mikey Min MD, Phone:  [phone]   Comp. Metabolic Panel (14) ([facility])   Result Value Ref Range    Glucose 101 (H) 65 - 99 mg/dL    Urea Nitrogen 18 8 - 27 mg/dL    Creatinine 0.86 0.57 - 1.00 mg/dL    eGFR If NonAfricn Am 70 >59 mL/min/1.73    eGFR If Africn Am 81 >59 mL/min/1.73    BUN/Creatinine Ratio 21 12 - 28    Sodium 141 134 - 144 mmol/L    Potassium 3.9 3.5 - 5.2 mmol/L    Chloride 99 96 - 106 mmol/L    Total CO2 28 18 - 28 mmol/L    Calcium 9.4 8.7 - 10.3 mg/dL    Protein Total 7.1 6.0 - 8.5 g/dL    Albumin 4.4 3.6 - 4.8 g/dL    Globulin, Total 2.7 1.5 - 4.5 g/dL    A/G Ratio 1.6 1.2 - 2.2     Bilirubin Total 0.6 0.0 - 1.2 mg/dL    Alkaline Phosphatase 106 39 - 117 IU/L    AST 19 0 - 40 IU/L    ALT 15 0 - 32 IU/L    Narrative    Performed at:  01 - [facility] [address], [place], [place]  [id number]  : Mikey Min MD, Phone:  [phone]           Assessment & Plan       ICD-10-CM    1. Encounter for medication refill Z76.0 atorvastatin (LIPITOR) 20 MG tablet   2. Hypertension goal BP (blood pressure) < 140/80 I10 amLODIPine (NORVASC) 10 MG tablet     Comp. Metabolic Panel (14) ([facility])   3. PMR (polymyalgia rheumatica) (H) M35.3 naproxen sodium (ANAPROX) 220 MG tablet   4. Osteopenia, unspecified location M85.80    5. Pure hypercholesterolemia E78.00 Lipid Panel ([facility])   6. Iron deficiency anemia, unspecified iron deficiency anemia type D50.9 CBC with Diff/Plt ([facility])   7. Vitamin D deficiency E55.9 Vitamin D  25-Hydroxy ([facility])          Patient Instructions   Labs today  Refill done  Schedule mammogram in October and Dexa scan    Shingrix vaccine. Check with insurance for coverage, then if desired get on the list at the pharmacy.  Pneumonia vaccine 23            Kaitlynn Lanza MD  [facility]

## 2019-07-10 ENCOUNTER — OFFICE VISIT (OUTPATIENT)
Dept: FAMILY MEDICINE | Facility: CLINIC | Age: 69
End: 2019-07-10

## 2019-07-10 VITALS
WEIGHT: 141 LBS | RESPIRATION RATE: 16 BRPM | DIASTOLIC BLOOD PRESSURE: 74 MMHG | HEART RATE: 58 BPM | SYSTOLIC BLOOD PRESSURE: 118 MMHG | BODY MASS INDEX: 23.11 KG/M2 | OXYGEN SATURATION: 97 %

## 2019-07-10 DIAGNOSIS — Z12.31 ENCOUNTER FOR SCREENING MAMMOGRAM FOR BREAST CANCER: ICD-10-CM

## 2019-07-10 DIAGNOSIS — M35.3 PMR (POLYMYALGIA RHEUMATICA) (H): ICD-10-CM

## 2019-07-10 DIAGNOSIS — I10 HYPERTENSION GOAL BP (BLOOD PRESSURE) < 140/80: ICD-10-CM

## 2019-07-10 DIAGNOSIS — E55.9 VITAMIN D DEFICIENCY: ICD-10-CM

## 2019-07-10 DIAGNOSIS — Z76.0 ENCOUNTER FOR MEDICATION REFILL: ICD-10-CM

## 2019-07-10 DIAGNOSIS — D50.9 IRON DEFICIENCY ANEMIA, UNSPECIFIED IRON DEFICIENCY ANEMIA TYPE: ICD-10-CM

## 2019-07-10 DIAGNOSIS — E78.00 PURE HYPERCHOLESTEROLEMIA: ICD-10-CM

## 2019-07-10 DIAGNOSIS — M85.80 OSTEOPENIA, UNSPECIFIED LOCATION: Primary | ICD-10-CM

## 2019-07-10 LAB
% GRANULOCYTES: 64.5 % (ref 42.2–75.2)
HCT VFR BLD AUTO: 37.4 % (ref 35–46)
HEMOGLOBIN: 12.3 G/DL (ref 11.8–15.5)
LYMPHOCYTES NFR BLD AUTO: 27.4 % (ref 20.5–51.1)
MCH RBC QN AUTO: 31.3 PG (ref 27–31)
MCHC RBC AUTO-ENTMCNC: 32.9 G/DL (ref 33–37)
MCV RBC AUTO: 95.2 FL (ref 80–100)
MONOCYTES NFR BLD AUTO: 8.1 % (ref 1.7–9.3)
PLATELET # BLD AUTO: 280 K/UL (ref 140–450)
RBC # BLD AUTO: 3.93 X10/CMM (ref 3.7–5.2)
WBC # BLD AUTO: 4.7 X10/CMM (ref 3.8–11)

## 2019-07-10 PROCEDURE — 36415 COLL VENOUS BLD VENIPUNCTURE: CPT | Performed by: FAMILY MEDICINE

## 2019-07-10 PROCEDURE — 85025 COMPLETE CBC W/AUTO DIFF WBC: CPT | Performed by: FAMILY MEDICINE

## 2019-07-10 PROCEDURE — 99214 OFFICE O/P EST MOD 30 MIN: CPT | Performed by: FAMILY MEDICINE

## 2019-07-10 RX ORDER — NAPROXEN SODIUM 220 MG
220 TABLET ORAL PRN
COMMUNITY
Start: 2019-07-10

## 2019-07-10 RX ORDER — ATORVASTATIN CALCIUM 20 MG/1
TABLET, FILM COATED ORAL
Qty: 90 TABLET | Refills: 3 | Status: SHIPPED | OUTPATIENT
Start: 2019-07-10 | End: 2021-07-17

## 2019-07-10 RX ORDER — AMLODIPINE BESYLATE 10 MG/1
TABLET ORAL
Qty: 90 TABLET | Refills: 3 | Status: SHIPPED | OUTPATIENT
Start: 2019-07-10 | End: 2020-07-21

## 2019-07-10 NOTE — LETTER
[facility] [address] [place]  [address]  Phone: [phone]    July 12, 2019      Staci Lopez  [address] [place] [address]              Dear Staci,    The results from your recent visit showed CBC was ok   CMP mildly elevated sugar and alkaline phosphatase, otherwise normal  Mild elevation in LDL cholesterol, risk ratio is ok  Vitamin D level was fine.    Minor changes that do not require action at this time.  Watch your diet.        Sincerely,     Kaitlynn Lanza M.D.    Results for orders placed or performed in visit on 07/10/19   Comp. Metabolic Panel (14) ([facility])   Result Value Ref Range    Glucose 100 (H) 65 - 99 mg/dL    Urea Nitrogen 21 8 - 27 mg/dL    Creatinine 0.82 0.57 - 1.00 mg/dL    eGFR If NonAfricn Am 74 >59 mL/min/1.73    eGFR If Africn Am 85 >59 mL/min/1.73    BUN/Creatinine Ratio 26 12 - 28    Sodium 143 134 - 144 mmol/L    Potassium 4.3 3.5 - 5.2 mmol/L    Chloride 105 96 - 106 mmol/L    Total CO2 27 20 - 29 mmol/L    Calcium 9.6 8.7 - 10.3 mg/dL    Protein Total 6.8 6.0 - 8.5 g/dL    Albumin 4.6 3.6 - 4.8 g/dL    Globulin, Total 2.2 1.5 - 4.5 g/dL    A/G Ratio 2.1 1.2 - 2.2    Bilirubin Total 0.5 0.0 - 1.2 mg/dL    Alkaline Phosphatase 118 (H) 39 - 117 IU/L    AST 18 0 - 40 IU/L    ALT 12 0 - 32 IU/L    Narrative    Performed at:  01 - [facility]  [address] [place] [address]  [id number]  : Ezra Turk MD, Phone:  [phone]   Lipid Panel ([facility])   Result Value Ref Range    Cholesterol 198 100 - 199 mg/dL    Triglycerides 51 0 - 149 mg/dL    HDL Cholesterol 84 >39 mg/dL    VLDL Cholesterol Devon 10 5 - 40 mg/dL    LDL Cholesterol Calculated 104 (H) 0 - 99 mg/dL    LDL/HDL Ratio 1.2 0.0 - 3.2 ratio    Narrative    Performed at:  01 - [facility] [address], [place], [place]  [id number]  : Ezra Turk MD, Phone:  [phone]   Vitamin D  25-Hydroxy ([facility])   Result Value Ref Range    Vitamin D,25-Hydroxy  35.9 30.0 - 100.0 ng/mL    Narrative    Performed at:  01 - [facility] [address], [place], [place]  [id number]  : Ezra Turk MD, Phone:  [phone]   [place] Diff[place]Plt [place]   Result Value Ref Range    WBC x10/cmm 4.7 3.8 - 11.0 x10/cmm    % Lymphocytes 27.4 20.5 - 51.1 %    % Monocytes 8.1 1.7 - 9.3 %    % Granulocytes 64.5 42.2 - 75.2 %    RBC x10/cmm 3.93 3.7 - 5.2 x10/cmm    Hemoglobin 12.3 11.8 - 15.5 g/dl    Hematocrit 37.4 35 - 46 %    MCV 95.2 80 - 100 fL    MCH 31.3 (A) 27.0 - 31.0 pg    MCHC 32.9 (A) 33.0 - 37.0 g/dL    Platelet Count 280 140 - 450 K/uL

## 2019-07-10 NOTE — PATIENT INSTRUCTIONS
Labs today  Refill done  Schedule mammogram in October and Dexa scan    Shingrix vaccine. Check with insurance for coverage, then if desired get on the list at the pharmacy.  Pneumonia vaccine 23

## 2019-07-11 LAB
ALBUMIN SERPL-MCNC: 4.6 G/DL (ref 3.6–4.8)
ALBUMIN/GLOB SERPL: 2.1 {RATIO} (ref 1.2–2.2)
ALP SERPL-CCNC: 118 IU/L (ref 39–117)
ALT SERPL-CCNC: 12 IU/L (ref 0–32)
AST SERPL-CCNC: 18 IU/L (ref 0–40)
BILIRUB SERPL-MCNC: 0.5 MG/DL (ref 0–1.2)
BUN SERPL-MCNC: 21 MG/DL (ref 8–27)
BUN/CREATININE RATIO: 26 (ref 12–28)
CALCIUM SERPL-MCNC: 9.6 MG/DL (ref 8.7–10.3)
CHLORIDE SERPLBLD-SCNC: 105 MMOL/L (ref 96–106)
CHOLEST SERPL-MCNC: 198 MG/DL (ref 100–199)
CREAT SERPL-MCNC: 0.82 MG/DL (ref 0.57–1)
EGFR IF AFRICN AM: 85 ML/MIN/1.73
EGFR IF NONAFRICN AM: 74 ML/MIN/1.73
GLOBULIN, TOTAL: 2.2 G/DL (ref 1.5–4.5)
GLUCOSE SERPL-MCNC: 100 MG/DL (ref 65–99)
HDLC SERPL-MCNC: 84 MG/DL
LDL/HDL RATIO: 1.2 RATIO (ref 0–3.2)
LDLC SERPL CALC-MCNC: 104 MG/DL (ref 0–99)
POTASSIUM SERPL-SCNC: 4.3 MMOL/L (ref 3.5–5.2)
PROT SERPL-MCNC: 6.8 G/DL (ref 6–8.5)
SODIUM SERPL-SCNC: 143 MMOL/L (ref 134–144)
TOTAL CO2: 27 MMOL/L (ref 20–29)
TRIGL SERPL-MCNC: 51 MG/DL (ref 0–149)
VITAMIN D, 25-HYDROXY: 35.9 NG/ML (ref 30–100)
VLDLC SERPL CALC-MCNC: 10 MG/DL (ref 5–40)

## 2019-07-19 NOTE — MR AVS SNAPSHOT
"              After Visit Summary   6/5/2018    Staci Lopez    MRN: [id number]           Patient Information     Date Of Birth          1950        Visit Information        Provider Department      6/5/2018 8:30 AM Kaitlynn Lanza MD [facility]        Today's Diagnoses     Encounter for medication refill    -  1    Hypertension goal BP (blood pressure) < 140/80        Impacted cerumen of left ear        PMR (polymyalgia rheumatica) (H)        Osteopenia, unspecified location        Pure hypercholesterolemia          Care Instructions    Tetanus Tdap, Shingrix (2 step)Check with insurance about coverage and PPSV23 pneumonia vaccine.     Labs today  Refills done    Left ear wash    DEXA next year  Colonscopy 2020  Mammogram in October          Follow-ups after your visit        Who to contact     If you have questions or need follow up information about today's clinic visit or your schedule please contact [facility] directly at [phone].  Normal or non-critical lab and imaging results will be communicated to you by TaposÃ©Â©hart, letter or phone within 4 business days after the clinic has received the results. If you do not hear from us within 7 days, please contact the clinic through TaposÃ©Â©hart or phone. If you have a critical or abnormal lab result, we will notify you by phone as soon as possible.  Submit refill requests through Socialcam or call your pharmacy and they will forward the refill request to us. Please allow 3 business days for your refill to be completed.          Additional Information About Your Visit        TaposÃ©Â©hart Information     Socialcam lets you send messages to your doctor, view your test results, renew your prescriptions, schedule appointments and more. To sign up, go to [email or web address] . Click on \"Log in\" on the left side of the screen, which will take you to the Welcome page. Then click on \"Sign up Now\" on the right side of the page.     You will be " asked to enter the access code listed below, as well as some personal information. Please follow the directions to create your username and password.     Your access code is: [email or web address]  Expires: 9/3/2018  8:59 AM     Your access code will  in 90 days. If you need help or a new code, please call your [place] clinic or [phone].        Care EveryWhere ID     This is your Care EveryWhere ID. This could be used by other organizations to access your [place] medical records  [id number]        Your Vitals Were     Pulse Respirations BMI (Body Mass Index)             78 16 23.43 kg/m2          Blood Pressure from Last 3 Encounters:   18 126/78   18 139/70   17 136/80    Weight from Last 3 Encounters:   18 64.9 kg (143 lb)   18 67.7 kg (149 lb 3.2 oz)   17 72.1 kg (159 lb)              We Performed the Following     Comp. Metabolic Panel (14) ([facility])     Lipid Panel ([facility])     Removal Impacted Cerumen Irrigation Unilateral (Ear Wash)          Today's Medication Changes          These changes are accurate as of 18  8:59 AM.  If you have any questions, ask your nurse or doctor.               These medicines have changed or have updated prescriptions.        Dose/Directions    atorvastatin 20 MG tablet   Commonly known as:  LIPITOR   This may have changed:  See the new instructions.   Used for:  Encounter for medication refill   Changed by:  Kaitlynn Lanza MD        Dose:  20 mg   Take 1 tablet (20 mg) by mouth daily   Quantity:  90 tablet   Refills:  3            Where to get your medicines      [id number]  [place] - [address]  [address] [place] [address]     Phone:  [phone]     amLODIPine 10 MG tablet    atorvastatin 20 MG tablet                Primary Care Provider Office Phone # Fax #    Kaitlynn Lanza [phone] [address]  [facility] [address]        Equal Access to Services     [facility] NAPOLEON : Hadii stefania ku hadleoo Soqueali, waaxda luqadaha, qaybta kaalmayolanda nelson, du blankenshipfabienmaria t dale. So [facility] [phone].    ATENCIÓN: Si habla español, tiene a smith disposición servicios gratuitos de asistencia lingüística. Dwayneame al [phone].    We comply with applicable federal civil rights laws and [place] laws. We do not discriminate on the basis of race, color, national origin, age, disability, sex, sexual orientation, or gender identity.            Thank you!     Thank you for choosing [facility]  for your care. Our goal is always to provide you with excellent care. Hearing back from our patients is one way we can continue to improve our services. Please take a few minutes to complete the written survey that you may receive in the mail after your visit with us. Thank you!             Your Updated Medication List - Protect others around you: Learn how to safely use, store and throw away your medicines at www.disposemymeds.org.          This list is accurate as of 6/5/18  8:59 AM.  Always use your most recent med list.                   Brand Name Dispense Instructions for use Diagnosis    amLODIPine 10 MG tablet    NORVASC    90 tablet    Take 1 tablet (10 mg) by mouth daily    Hypertension goal BP (blood pressure) < 140/80       aspirin 81 MG tablet     90 tablet    Take 1 tablet by mouth daily.    Routine general medical examination at a health care facility       atorvastatin 20 MG tablet    LIPITOR    90 tablet    Take 1 tablet (20 mg) by mouth daily    Encounter for medication refill       glucosamine chondroitin 1500 complex Caps      Take 1 capsule by mouth daily.    Routine general medical examination at a health care facility       ibuprofen 200 MG capsule      Take 600 mg by mouth 2 times daily.        Multi-vitamin Tabs tablet   Generic drug:  multivitamin, therapeutic with minerals     100  tablet    Take 1 tablet by mouth daily.    Routine general medical examination at a health care facility       SM CALCIUM 500/VITAMIN D3 PO      Take 1 tablet by mouth daily.        vitamin D 1000 units capsule     100 capsule    Take 1 capsule by mouth daily    PMR (polymyalgia rheumatica) (H)          no

## 2019-11-20 ENCOUNTER — HOSPITAL ENCOUNTER (OUTPATIENT)
Dept: BONE DENSITY | Facility: CLINIC | Age: 69
Discharge: HOME | End: 2019-11-20
Attending: FAMILY MEDICINE | Admitting: FAMILY MEDICINE
Payer: COMMERCIAL

## 2019-11-20 ENCOUNTER — HOSPITAL ENCOUNTER (OUTPATIENT)
Dept: MAMMOGRAPHY | Facility: CLINIC | Age: 69
End: 2019-11-20
Attending: FAMILY MEDICINE
Payer: COMMERCIAL

## 2019-11-20 DIAGNOSIS — M85.80 OSTEOPENIA, UNSPECIFIED LOCATION: ICD-10-CM

## 2019-11-20 DIAGNOSIS — Z12.31 ENCOUNTER FOR SCREENING MAMMOGRAM FOR BREAST CANCER: ICD-10-CM

## 2019-11-20 PROCEDURE — 77080 DXA BONE DENSITY AXIAL: CPT

## 2019-11-20 PROCEDURE — 77067 SCR MAMMO BI INCL CAD: CPT

## 2020-07-21 DIAGNOSIS — Z76.0 ENCOUNTER FOR MEDICATION REFILL: ICD-10-CM

## 2020-07-21 DIAGNOSIS — I10 HYPERTENSION GOAL BP (BLOOD PRESSURE) < 140/80: ICD-10-CM

## 2020-07-21 RX ORDER — AMLODIPINE BESYLATE 10 MG/1
TABLET ORAL
Qty: 90 TABLET | Refills: 3 | Status: SHIPPED | OUTPATIENT
Start: 2020-07-21 | End: 2021-07-17

## 2020-07-21 RX ORDER — ATORVASTATIN CALCIUM 20 MG/1
20 TABLET, FILM COATED ORAL DAILY
Qty: 90 TABLET | Refills: 3 | Status: SHIPPED | OUTPATIENT
Start: 2020-07-21 | End: 2020-08-26

## 2020-08-26 ENCOUNTER — VIRTUAL VISIT (OUTPATIENT)
Dept: FAMILY MEDICINE | Facility: CLINIC | Age: 70
End: 2020-08-26

## 2020-08-26 DIAGNOSIS — R05.9 COUGH: ICD-10-CM

## 2020-08-26 DIAGNOSIS — Z20.822 EXPOSURE TO COVID-19 VIRUS: Primary | ICD-10-CM

## 2020-08-26 DIAGNOSIS — Z71.89 ADVICE GIVEN ABOUT COVID-19 VIRUS INFECTION: ICD-10-CM

## 2020-08-26 DIAGNOSIS — J02.9 SORE THROAT: ICD-10-CM

## 2020-08-26 PROCEDURE — 99213 OFFICE O/P EST LOW 20 MIN: CPT | Mod: 95 | Performed by: NURSE PRACTITIONER

## 2020-08-26 NOTE — PROGRESS NOTES
Problem(s) Oriented visit      Video-Visit Details    Type of service:  Video Visit    Video Start Time (time video started): 1118    Video End Time (time video stopped): 1129    Originating Location (pt. Location): Home    Distant Location (provider location):  [facility]     Mode of Communication:  Video Conference via Factime    Physician has received verbal consent for a Video Visit from the patient? Yes      JESSICA Garcia CNP        SUBJECTIVE:                                                    Staci Lopez is a 69 year old female who presents to clinic today for the following health issues :    Staci is concerned about COVID exposure. Was with 12 family members last week, and 8 of them have since tested positive for COVID. Many started developing symptoms on 8/21, which was when she was around them the longest. She has been having some throat irritation and a dry cough. Denies fevers, chills, headaches, changes in vision, sinus congestion, anosmia, chest pain or tightness, nausea/vomiting, diarrhea, new arthralgias, fatigue, or rashes. Lives alone, has been quarantining and states she can have groceries delivered. Has a thermometer and pulse oximeter at home as well as zinc, vitamin C, and vitamin D. No smoking.      Problem list, Medication list, Allergies, and Medical/Social/Surgical histories reviewed in [facility] and updated as appropriate.   Additional history: as documented    ROS:  Gen, HEENT, CV, resp, GI, MSK, neuro negative except as listed per HPI    Histories:   Patient Active Problem List   Diagnosis     Pure hypercholesterolemia     Hypertension goal BP (blood pressure) < 140/80     Hip pain     PMR (polymyalgia rheumatica) (H)     ACP (advance care planning)     Osteopenia     Past Surgical History:   Procedure Laterality Date     BREAST BIOPSY, RT/LT      x 2 - benign       Social History     Tobacco Use     Smoking status: Former Smoker     Types: Cigarettes      Last attempt to quit: 2010     Years since quittin.8     Smokeless tobacco: Never Used   Substance Use Topics     Alcohol use: Yes     Alcohol/week: 7.0 standard drinks     Types: 7 Glasses of wine per week     Family History   Problem Relation Age of Onset     Arthritis Mother      Neurologic Disorder Paternal Grandmother         bipolar     High cholesterol Brother      High cholesterol Brother      High cholesterol Brother      Other - See Comments Father         fall chest full on blood after fall in the hospital 18     Hyperlipidemia Sister         breast cancer     Cancer Maternal Grandmother      Parkinsonism Maternal Grandfather          Current Outpatient Medications   Medication Sig Dispense Refill     amLODIPine (NORVASC) 10 MG tablet TAKE 1 TABLET BY MOUTH DAILY. LAST REFILL. PATIENT NEEDS TO BE SEEN AND HAVE LABS. 90 tablet 3     aspirin 81 MG tablet Take 1 tablet by mouth daily. 90 tablet 3     atorvastatin (LIPITOR) 20 MG tablet TAKE 1 TABLET BY MOUTH EVERY EVENING 90 tablet 3     Calcium Carbonate-Vitamin D (SM CALCIUM 500/VITAMIN D3 PO) Take 1 tablet by mouth daily.       Cholecalciferol (VITAMIN D) 1000 UNITS capsule Take 1 capsule by mouth daily 100 capsule 12     Glucosamine-Chondroit-Vit C-Mn (GLUCOSAMINE CHONDROITIN 1500 COMPLEX) CAPS Take 1 capsule by mouth daily.       Multiple Vitamin (MULTI-VITAMIN) per tablet Take 1 tablet by mouth daily. 100 tablet 3     naproxen sodium (ANAPROX) 220 MG tablet Take 1 tablet (220 mg) by mouth 2 times daily (with meals)         OBJECTIVE:                                                    No vitals taken- virtual visit  Constitutional: Alert and oriented non-toxic appearing female in no acute distress  Resp: Respirations unlabored, speaking in full sentences without apparent dyspnea, wheezing, or coughing  Psych: Pleasant and interactive, affect euthymic, makes appropriate eye contact, answers questions appropriately, thought content logical        ASSESSMENT/PLAN:                                                        Staci was seen today for suspected covid.    Diagnoses and all orders for this visit:    Exposure to COVID-19 virus  Advice given about COVID-19 virus infection  Sore throat  Cough  -     Symptomatic COVID-19 Virus (Coronavirus) by PCR; Future    Exposure with symptoms- COVID testing ordered. To quarantine for 14 days regardless of test results. Reviewed symptomatic cares, quarantine/infection control recs, and when to seek emergency care.      Patient needs assistance with ADLs: none identified today  Patient needs assistance with iADLs: none identified today    The following health maintenance items are reviewed in Epic and correct as of today:  Health Maintenance   Topic Date Due     DTAP/TDAP/TD IMMUNIZATION (2 - Td) 10/16/2016     MEDICARE ANNUAL WELLNESS VISIT  02/16/2017     PNEUMOCOCCAL IMMUNIZATION 65+ LOW/MEDIUM RISK (2 of 2 - PPSV23) 02/16/2017     ADVANCE CARE PLANNING  04/01/2018     FALL RISK ASSESSMENT  04/17/2018     PHQ-2  01/01/2020     ZOSTER IMMUNIZATION (2 of 2) 01/15/2020     INFLUENZA VACCINE (1) 09/01/2020     COLORECTAL CANCER SCREENING  11/06/2020     MAMMO SCREENING  11/20/2021     DEXA  11/20/2022     LIPID  07/10/2024     HEPATITIS C SCREENING  Completed     IPV IMMUNIZATION  Aged Out     MENINGITIS IMMUNIZATION  Aged Out     HEPATITIS B IMMUNIZATION  Aged Out       This was a virtual video-visit conducted during COVID-19 outbreak in regulation with social distancing and quarantine recommendations of the CDC and [place] department of health and human services. A two way audio/video connection was used in real time with patient's consent.      JESSICA Garcia CNP  [facility]  Family Practice  [facility]  [phone]    For any issues my office # is [phone]

## 2020-08-27 DIAGNOSIS — Z20.822 EXPOSURE TO COVID-19 VIRUS: ICD-10-CM

## 2020-08-27 DIAGNOSIS — J02.9 SORE THROAT: ICD-10-CM

## 2020-08-27 DIAGNOSIS — R05.9 COUGH: ICD-10-CM

## 2020-08-27 PROCEDURE — U0003 INFECTIOUS AGENT DETECTION BY NUCLEIC ACID (DNA OR RNA); SEVERE ACUTE RESPIRATORY SYNDROME CORONAVIRUS 2 (SARS-COV-2) (CORONAVIRUS DISEASE [COVID-19]), AMPLIFIED PROBE TECHNIQUE, MAKING USE OF HIGH THROUGHPUT TECHNOLOGIES AS DESCRIBED BY CMS-2020-01-R: HCPCS | Performed by: NURSE PRACTITIONER

## 2020-08-27 NOTE — PROGRESS NOTES
COVID-19 PCR test completed. Patient handout For Patients Who Have Been Tested for Covid-19 (Coronavirus) was given to the patient, which includes test result notification process.     20-Jul-2020 19:48

## 2020-08-28 ENCOUNTER — NURSE TRIAGE (OUTPATIENT)
Dept: NURSING | Facility: CLINIC | Age: 70
End: 2020-08-28

## 2020-08-28 ENCOUNTER — TELEPHONE (OUTPATIENT)
Dept: EMERGENCY MEDICINE | Facility: CLINIC | Age: 70
End: 2020-08-28

## 2020-08-28 LAB
SARS-COV-2 RNA SPEC QL NAA+PROBE: ABNORMAL
SPECIMEN SOURCE: ABNORMAL

## 2020-08-28 NOTE — TELEPHONE ENCOUNTER
Additional Information    Negative: RN needs further essential information from caller in order to complete triage    Negative: Requesting regular office appointment    Negative: [1] Caller requesting NON-URGENT health information AND [2] PCP's office is the best resource    Health Information question, no triage required and triager able to answer question    Protocols used: INFORMATION ONLY CALL-A-AH

## 2020-08-28 NOTE — TELEPHONE ENCOUNTER
Coronavirus (COVID-19) Notification    Reason for call  Notify of POSITIVE  COVID-19 lab result, assess symptoms,  review [facility] recommendations    Lab Result   Lab test for 2019-nCoV rRt-PCR or SARS-COV-2 PCR  Oropharyngeal AND/OR nasopharyngeal swabs were POSITIVE for 2019-nCoV RNA [OR] SARS-COV-2 RNA (COVID-19) RNA     We have been unable to reach Patient by phone at this time to notify of their Positive COVID-19 result.  Left voicemail message requesting a call back to [phone] [facility] for results.        POSITIVE COVID-19 Letter sent.    [Name]  Dennis Brewster RN  [facility]  COVID19 Results Team RN  Ph# [phone]

## 2020-08-28 NOTE — TELEPHONE ENCOUNTER
"Coronavirus (COVID-19) Notification    Caller Name (Patient, parent, daughter/son, grandparent, etc)  Patient    Reason for call  Notify of Positive Coronavirus (COVID-19) lab results, assess symptoms,  review [facility] recommendations    Lab Result    Lab test:  2019-nCoV rRt-PCR or SARS-CoV-2 PCR    Oropharyngeal AND/OR nasopharyngeal swabs is POSITIVE for 2019-nCoV RNA/SARS-COV-2 PCR (COVID-19 virus)    RN Recommendations/Instructions per [facility] Coronavirus COVID-19 recommendations    Brief introduction script  Introduce self then review script:  \"I am calling on behalf of [employer].  We were notified that your Coronavirus test (COVID-19) for was POSITIVE for the virus.  I have some information to relay to you but first I wanted to mention that the [place] Dept of Health will be contacting you shortly [it's possible MD already called Patient] to talk to you more about how you are feeling and other people you have had contact with who might now also have the virus.  Also, [facility] is Partnering with the [facility] for Covid-19 research, you may be contacted directly by research staff.\"    Assessment (Inquire about Patient's current symptoms)   Assessment   Current Symptoms at time of phone call: (if no symptoms, document No symptoms] Cough, nasal drainage, loss of taste and smell, body aches, fever   Symptoms onset (if applicable) 2 days     If at time of call, Patients symptoms hare worsened, the Patient should contact 911 or have someone drive them to Emergency Dept promptly:      If Patient calling 911, inform 911 personal that you have tested positive for the Coronavirus (COVID-19).  Place mask on and await 911 to arrive.    If Emergency Dept, If possible, please have another adult drive you to the Emergency Dept but you need to wear mask when in contact with other people.      Review information with Patient    Your result was positive. This means you have COVID-19 " (coronavirus).  We have sent you a letter that reviews the information that I'll be reviewing with you now.    How can I protect others?    If you have symptoms: stay home and away from others (self-isolate) until:    You've had no fever--and no medicine that reduces fever--for 1 full day (24 hours). And       Your other symptoms have gotten better. For example, your cough or breathing has improved. And     At least 10 days have passed since your symptoms started. (If you've been told by a doctor that you have a weak immune system, wait 20 days.)     If you don't have symptoms: Stay home and away from others (self-isolate) until at least 10 days have passed since your first positive COVID-19 test. (Date test collected)    During this time:    Stay in your own room, including for meals. Use your own bathroom if you can.    Stay away from others in your home. No hugging, kissing or shaking hands. No visitors.     Don't go to work, school or anywhere else.     Clean  high touch  surfaces often (doorknobs, counters, handles, etc.). Use a household cleaning spray or wipes. You'll find a full list on the EPA website at www.epa.gov/pesticide-registration/list-n-disinfectants-use-against-sars-cov-2.     Cover your mouth and nose with a mask, tissue or other face covering to avoid spreading germs.    Wash your hands and face often with soap and water.    Caregivers in these groups are at risk for severe illness due to COVID-19:  o People 65 years and older  o People who live in a nursing home or long-term care facility  o People with chronic disease (lung, heart, cancer, diabetes, kidney, liver, immunologic)  o People who have a weakened immune system, including those who:  - Are in cancer treatment  - Take medicine that weakens the immune system, such as corticosteroids  - Had a bone marrow or organ transplant  - Have an immune deficiency  - Have poorly controlled HIV or AIDS  - Are obese (body mass index of 40 or  higher)  - Smoke regularly    Caregivers should wear gloves while washing dishes, handling laundry and cleaning bedrooms and bathrooms.    Wash and dry laundry with special caution. Don't shake dirty laundry, and use the warmest water setting you can.    If you have a weakened immune system, ask your doctor about other actions you should take.    For more tips, go to www.cdc.gov/coronavirus/2019-ncov/downloads/10Things.pdf.    You should not go back to work until you meet the guidelines above for ending your home isolation. You should meet these along with any other guidelines that your employer has.    Employers: This document serves as formal notice of your employee's medical guidelines for going back to work. They must meet the above guidelines before going back to work in person.    How can I take care of myself?    1. Get lots of rest. Drink extra fluids (unless a doctor has told you not to).    2. Take Tylenol (acetaminophen) for fever or pain. If you have liver or kidney problems, ask your family doctor if it's okay to take Tylenol.     Take either:     650 mg (two 325 mg pills) every 4 to 6 hours, or     1,000 mg (two 500 mg pills) every 8 hours as needed.     Note: Don't take more than 3,000 mg in one day. Acetaminophen is found in many medicines (both prescribed and over-the-counter medicines). Read all labels to be sure you don't take too much.    For children, check the Tylenol bottle for the right dose (based on their age or weight).    3. If you have other health problems (like cancer, heart failure, an organ transplant or severe kidney disease): Call your specialty clinic if you don't feel better in the next 2 days.    4. Know when to call 911: Emergency warning signs include:    Trouble breathing or shortness of breath    Pain or pressure in the chest that doesn't go away    Feeling confused like you haven't felt before, or not being able to wake up    Bluish-colored lips or face    5. Sign up for  Pam [employer]. We know it's scary to hear that you have COVID-19. We want to track your symptoms to make sure you're okay over the next 2 weeks. Please look for an email from Pam Stewart--this is a free, online program that we'll use to keep in touch. To sign up, follow the link in the email. Learn more at [email or web address].    Where can I get more information?    [facility]: [email or web address]    Coronavirus Basics: www.health.[facility].[place]./diseases/coronavirus/basics.html    What to Do If You're Sick: www.cdc.gov/coronavirus/2019-ncov/about/steps-when-sick.html    Ending Home Isolation: www.cdc.gov/coronavirus/2019-ncov/hcp/disposition-in-home-patients.html     Caring for Someone with COVID-19: www.cdc.gov/coronavirus/2019-ncov/if-you-are-sick/care-for-someone.html     [facility] clinical trials (COVID-19 research studies): clinicalaffairs.[facility]-clinical-trials     A Positive COVID-19 letter will be sent via [employer] or the mail. (Exception, no letters sent to Presurgerical/Preprocedure Patients)    She verbalized understanding and had no further questions.     Gregoria Sterling RN/DIANDRA

## 2020-12-11 ENCOUNTER — OFFICE VISIT (OUTPATIENT)
Dept: FAMILY MEDICINE | Facility: CLINIC | Age: 70
End: 2020-12-11

## 2020-12-11 ENCOUNTER — TELEPHONE (OUTPATIENT)
Dept: FAMILY MEDICINE | Facility: CLINIC | Age: 70
End: 2020-12-11

## 2020-12-11 ENCOUNTER — TRANSFERRED RECORDS (OUTPATIENT)
Dept: FAMILY MEDICINE | Facility: CLINIC | Age: 70
End: 2020-12-11

## 2020-12-11 VITALS
SYSTOLIC BLOOD PRESSURE: 135 MMHG | DIASTOLIC BLOOD PRESSURE: 64 MMHG | WEIGHT: 143 LBS | BODY MASS INDEX: 23.43 KG/M2 | HEART RATE: 93 BPM | OXYGEN SATURATION: 95 % | TEMPERATURE: 98.3 F

## 2020-12-11 DIAGNOSIS — M19.042 PRIMARY OSTEOARTHRITIS OF BOTH HANDS: ICD-10-CM

## 2020-12-11 DIAGNOSIS — M25.50 MULTIPLE JOINT PAIN: ICD-10-CM

## 2020-12-11 DIAGNOSIS — M35.3 PMR (POLYMYALGIA RHEUMATICA) (H): ICD-10-CM

## 2020-12-11 DIAGNOSIS — M79.89 CALF SWELLING: ICD-10-CM

## 2020-12-11 DIAGNOSIS — R70.0 ELEVATED ERYTHROCYTE SEDIMENTATION RATE: Primary | ICD-10-CM

## 2020-12-11 DIAGNOSIS — I10 HYPERTENSION GOAL BP (BLOOD PRESSURE) < 140/80: Primary | ICD-10-CM

## 2020-12-11 DIAGNOSIS — E78.00 PURE HYPERCHOLESTEROLEMIA: ICD-10-CM

## 2020-12-11 DIAGNOSIS — M19.041 PRIMARY OSTEOARTHRITIS OF BOTH HANDS: ICD-10-CM

## 2020-12-11 LAB
% GRANULOCYTES: 73.4 % (ref 42.2–75.2)
ERYTHROCYTE [SEDIMENTATION RATE] IN BLOOD: 53 MM/HR (ref 0–20)
HCT VFR BLD AUTO: 35.7 % (ref 35–46)
HEMOGLOBIN: 12 G/DL (ref 11.8–15.5)
LYMPHOCYTES NFR BLD AUTO: 21.1 % (ref 20.5–51.1)
MCH RBC QN AUTO: 32.5 PG (ref 27–31)
MCHC RBC AUTO-ENTMCNC: 33.8 G/DL (ref 33–37)
MCV RBC AUTO: 96.3 FL (ref 80–100)
MONOCYTES NFR BLD AUTO: 5.5 % (ref 1.7–9.3)
PLATELET # BLD AUTO: 361 K/UL (ref 140–450)
RBC # BLD AUTO: 3.7 X10/CMM (ref 3.7–5.2)
WBC # BLD AUTO: 6.3 X10/CMM (ref 3.8–11)

## 2020-12-11 PROCEDURE — 85025 COMPLETE CBC W/AUTO DIFF WBC: CPT | Performed by: FAMILY MEDICINE

## 2020-12-11 PROCEDURE — 85651 RBC SED RATE NONAUTOMATED: CPT | Performed by: FAMILY MEDICINE

## 2020-12-11 PROCEDURE — 99214 OFFICE O/P EST MOD 30 MIN: CPT | Performed by: FAMILY MEDICINE

## 2020-12-11 PROCEDURE — 93050 ART PRESSURE WAVEFORM ANALYS: CPT | Performed by: FAMILY MEDICINE

## 2020-12-11 RX ORDER — PREDNISONE 10 MG/1
10 TABLET ORAL DAILY
Qty: 30 TABLET | Refills: 0 | Status: SHIPPED | OUTPATIENT
Start: 2020-12-11 | End: 2021-01-11

## 2020-12-11 ASSESSMENT — ANXIETY QUESTIONNAIRES
3. WORRYING TOO MUCH ABOUT DIFFERENT THINGS: NOT AT ALL
IF YOU CHECKED OFF ANY PROBLEMS ON THIS QUESTIONNAIRE, HOW DIFFICULT HAVE THESE PROBLEMS MADE IT FOR YOU TO DO YOUR WORK, TAKE CARE OF THINGS AT HOME, OR GET ALONG WITH OTHER PEOPLE: SOMEWHAT DIFFICULT
1. FEELING NERVOUS, ANXIOUS, OR ON EDGE: SEVERAL DAYS
5. BEING SO RESTLESS THAT IT IS HARD TO SIT STILL: SEVERAL DAYS
6. BECOMING EASILY ANNOYED OR IRRITABLE: SEVERAL DAYS
2. NOT BEING ABLE TO STOP OR CONTROL WORRYING: SEVERAL DAYS
GAD7 TOTAL SCORE: 6
7. FEELING AFRAID AS IF SOMETHING AWFUL MIGHT HAPPEN: SEVERAL DAYS

## 2020-12-11 ASSESSMENT — PATIENT HEALTH QUESTIONNAIRE - PHQ9
SUM OF ALL RESPONSES TO PHQ QUESTIONS 1-9: 6
5. POOR APPETITE OR OVEREATING: SEVERAL DAYS

## 2020-12-11 NOTE — PROGRESS NOTES
Med check,    PMR is under good control.   Shoulders ache, rolling over in bed  Feels like muscles are pulled.  Swollen in the knee  Has been off the prednisone since 2016.....  Went on line and found that her symptoms have     Had COVID 3 months ago, did get smell back.    Was in [place] in [facility].    Hyperlipidemia:  Has history of hyperlipidemia.    The patient is  taking a medication for this.  Denies any significant side effects from his medication.      Latest labs reviewed:    Recent Labs   Lab Test 07/10/19  0900 06/05/18  0941 04/27/13  0000 04/27/13   CHOL 198 198   < > 308*   HDL 84 97   < > 102   * 89   < > 186   TRIG 51 60   < > 101   CHOLHDLRATIO  --   --   --  3.0    < > = values in this interval not displayed.        Lab Results   Component Value Date    AST 18 07/10/2019      Essential Hypertension   Remains well controlled when checked out of clinic.   she has not experienced any significant side effects from medications for hypertension.    NO active cardiac complaints or symptoms with exercise.  Current medications for treatment:  Calcium channel blocker (Amlodipine, Diltiazem,Verapamil)    Reviewed last 6 BP readings in chart:  BP Readings from Last 6 Encounters:   12/11/20 135/64   07/10/19 118/74   06/05/18 126/78   02/28/18 139/70   04/17/17 136/80   02/16/16 135/60       Problem(s) Oriented visit      ROS:  General and Resp. completed and negative except as noted above     HISTORY:   reports current alcohol use of about 7.0 standard drinks of alcohol per week.   reports that she quit smoking about 10 years ago. Her smoking use included cigarettes. She has never used smokeless tobacco.    Past Medical History:   Diagnosis Date     ACP (advance care planning)      Colon adenoma 2009     Hyperlipidemia LDL goal < 130      Hypertension goal BP (blood pressure) < 140/80      Osteopenia      PMR (polymyalgia rheumatica) (H)      Pure hypercholesterolemia      Past Surgical  History:   Procedure Laterality Date     BREAST BIOPSY, RT/LT      x 2 - benign       EXAM:  BP: 135/64[AtCor[   Pulse: 93    Temp: 98.3    Wt Readings from Last 2 Encounters:   12/11/20 64.9 kg (143 lb)   07/10/19 64 kg (141 lb)       BMI= Body mass index is 23.43 kg/m .    EXAM:  APPEARANCE: = Relaxed and in no distress  Conj/Eyelids = noninjected and lids and lashes are without inflammation  PERRLA/Irises = Pupils Round Reactive to Light and Irisis without inflammation  Neck = No anterior or posterior adenopathy appreciated.  Thyroid = Not enlarged and no masses felt  Resp effort = Calm regular breathing  Breath Sounds = Good air movement with no rales or rhonchi in any lung fields  Heart Rate, Rythym, & sounds (no Murm)  = Regular rate and rythym with no S3, S4, or murmer appreciated.  Carotid Art's = Pulses full and equal and no bruits appreciated  Abdomen = Soft, nontender, no masses, & bowel sounds in all quadrants  Liver/Spleen = Normal span and no splenomegaly noted  Digits and Nails = FROM in all finger joints, no nail dystrophy  Ext (edema) = No pretibial edema noted or elsewhere  Musculsktl: extremities normal- no gross deformities noted and lef calf is swollen  SKIN = absent significant rashes or lesions   Recent/Remote Memory = Alert and Oriented x 3  Mood/Affect = Cooperative and interested      Assessment/Plan:  Staci was seen today for recheck medication and musculoskeletal problem.    Diagnoses and all orders for this visit:    Hypertension goal BP (blood pressure) < 140/80  -     WI ART PRESS WAVEFORM ANALYS CENTRAL ART PRESSURE  -     Comp. Metabolic Panel (14) ([facility])    Multiple joint pain  -     CBC with Diff/Plt ([facility])  -     Sed Rate Westergren ([facility])  -     YULISA w/Reflex ([facility])    Primary osteoarthritis of both hands    Pure hypercholesterolemia  -     Lipid Panel ([facility])    Calf swelling  -     Referral to [facility]        COUNSELING:   reports that she quit smoking about 10  "years ago. Her smoking use included cigarettes. She has never used smokeless tobacco.    Estimated body mass index is 23.43 kg/m  as calculated from the following:    Height as of 4/17/17: 1.664 m (5' 5.5\").    Weight as of this encounter: 64.9 kg (143 lb).       Appropriate preventive services were discussed with this patient, including applicable screening as appropriate for cardiovascular disease, diabetes, osteopenia/osteoporosis, and glaucoma.  As appropriate for age/gender, discussed screening for colorectal cancer, prostate cancer, breast cancer, and cervical cancer. Checklist reviewing preventive services available has been given to the patient.    Reviewed patients plan of care and provided an AVS. The Basic Care Plan (routine screening as documented in Health Maintenance) for Staci meets the Care Plan requirement. This Care Plan has been established and reviewed with the  Patient.      The following health maintenance items are reviewed in Epic and correct as of today:  Health Maintenance   Topic Date Due     DTAP/TDAP/TD IMMUNIZATION (2 - Td) 10/16/2016     MEDICARE ANNUAL WELLNESS VISIT  02/16/2017     Pneumococcal Vaccine: 65+ Years (2 of 2 - PPSV23) 02/16/2017     ADVANCE CARE PLANNING  04/01/2018     FALL RISK ASSESSMENT  04/17/2018     INFLUENZA VACCINE (1) 09/01/2020     ZOSTER IMMUNIZATION (2 of 2) 01/15/2020     COLORECTAL CANCER SCREENING  11/06/2020     MAMMO SCREENING  11/20/2021     DEXA  11/20/2022     LIPID  07/10/2024     HEPATITIS C SCREENING  Completed     PHQ-2  Completed     Pneumococcal Vaccine: Pediatrics (0 to 5 Years) and At-Risk Patients (6 to 64 Years)  Aged Out     IPV IMMUNIZATION  Aged Out     MENINGITIS IMMUNIZATION  Aged Out     HEPATITIS B IMMUNIZATION  Aged Out       Isael Pike  [facility]  For any issues my office # is [phone]            "

## 2020-12-11 NOTE — TELEPHONE ENCOUNTER
Ed - ultrasound tech at [facility]- called reporting left lower extremity venous doppler today was negative for DVT but did show 13cm fluid accumulation in medical calf which is likely a ruptured bakers cyst. Per Dr. Pike patient informed and encouraged to use compression, elevation and OTC analgesic prn. Call if symptoms worsen or fail to resolve.     Per Dr. Pike, also informed ESR was elevated at 53 today. Patient has hx PMR and been off prednisone since 2016. Dr. Pike recommends restart prednisone 10mg QD 1 month and RTC for ESR and visit in 4 weeks (before runs out of pred). Avoid other OTC NSAIDs while on pred - okay for tylenol prn. Patient agrees to plan and will call prn. Rx sent to pharmacy.   Lorene Quiroga RN

## 2020-12-12 ASSESSMENT — ANXIETY QUESTIONNAIRES: GAD7 TOTAL SCORE: 6

## 2020-12-14 LAB
ALBUMIN SERPL-MCNC: 4.4 G/DL (ref 3.8–4.8)
ALBUMIN/GLOB SERPL: 1.9 {RATIO} (ref 1.2–2.2)
ALP SERPL-CCNC: 103 IU/L (ref 39–117)
ALT SERPL-CCNC: 11 IU/L (ref 0–32)
ANA DIRECT: NEGATIVE
AST SERPL-CCNC: 18 IU/L (ref 0–40)
BILIRUB SERPL-MCNC: 0.6 MG/DL (ref 0–1.2)
BUN SERPL-MCNC: 16 MG/DL (ref 8–27)
BUN/CREATININE RATIO: 23 (ref 12–28)
CALCIUM SERPL-MCNC: 9.6 MG/DL (ref 8.7–10.3)
CHLORIDE SERPLBLD-SCNC: 102 MMOL/L (ref 96–106)
CHOLEST SERPL-MCNC: 179 MG/DL (ref 100–199)
CREAT SERPL-MCNC: 0.7 MG/DL (ref 0.57–1)
EGFR IF AFRICN AM: 102 ML/MIN/1.73
EGFR IF NONAFRICN AM: 88 ML/MIN/1.73
GLOBULIN, TOTAL: 2.3 G/DL (ref 1.5–4.5)
GLUCOSE SERPL-MCNC: 101 MG/DL (ref 65–99)
HDLC SERPL-MCNC: 72 MG/DL
LDL/HDL RATIO: 1.3 RATIO (ref 0–3.2)
LDLC SERPL CALC-MCNC: 94 MG/DL (ref 0–99)
POTASSIUM SERPL-SCNC: 4.3 MMOL/L (ref 3.5–5.2)
PROT SERPL-MCNC: 6.7 G/DL (ref 6–8.5)
SODIUM SERPL-SCNC: 141 MMOL/L (ref 134–144)
TOTAL CO2: 23 MMOL/L (ref 20–29)
TRIGL SERPL-MCNC: 70 MG/DL (ref 0–149)
VLDLC SERPL CALC-MCNC: 13 MG/DL (ref 5–40)

## 2021-01-11 ENCOUNTER — OFFICE VISIT (OUTPATIENT)
Dept: FAMILY MEDICINE | Facility: CLINIC | Age: 71
End: 2021-01-11

## 2021-01-11 VITALS
SYSTOLIC BLOOD PRESSURE: 137 MMHG | OXYGEN SATURATION: 99 % | RESPIRATION RATE: 16 BRPM | WEIGHT: 144.8 LBS | HEART RATE: 74 BPM | BODY MASS INDEX: 23.27 KG/M2 | HEIGHT: 66 IN | DIASTOLIC BLOOD PRESSURE: 61 MMHG | TEMPERATURE: 97.9 F

## 2021-01-11 DIAGNOSIS — I10 HYPERTENSION GOAL BP (BLOOD PRESSURE) < 140/80: Primary | ICD-10-CM

## 2021-01-11 DIAGNOSIS — M35.3 PMR (POLYMYALGIA RHEUMATICA) (H): ICD-10-CM

## 2021-01-11 DIAGNOSIS — R70.0 ELEVATED ERYTHROCYTE SEDIMENTATION RATE: ICD-10-CM

## 2021-01-11 DIAGNOSIS — Z12.11 SPECIAL SCREENING FOR MALIGNANT NEOPLASMS, COLON: ICD-10-CM

## 2021-01-11 DIAGNOSIS — Z12.31 ENCOUNTER FOR SCREENING MAMMOGRAM FOR BREAST CANCER: ICD-10-CM

## 2021-01-11 DIAGNOSIS — U07.1 2019 NOVEL CORONAVIRUS DISEASE (COVID-19): ICD-10-CM

## 2021-01-11 PROCEDURE — 99214 OFFICE O/P EST MOD 30 MIN: CPT | Performed by: FAMILY MEDICINE

## 2021-01-11 PROCEDURE — 36415 COLL VENOUS BLD VENIPUNCTURE: CPT | Performed by: FAMILY MEDICINE

## 2021-01-11 PROCEDURE — 93050 ART PRESSURE WAVEFORM ANALYS: CPT | Performed by: FAMILY MEDICINE

## 2021-01-11 RX ORDER — PREDNISONE 5 MG/1
10 TABLET ORAL DAILY
Qty: 60 TABLET | Refills: 4 | Status: SHIPPED | OUTPATIENT
Start: 2021-01-11 | End: 2021-01-19

## 2021-01-11 RX ORDER — MULTIVIT WITH MINERALS/LUTEIN
1000 TABLET ORAL DAILY
COMMUNITY

## 2021-01-11 ASSESSMENT — MIFFLIN-ST. JEOR: SCORE: 1189.59

## 2021-01-11 NOTE — LETTER
[facility] [address] [place]  [address]  Phone: [phone]    January 14, 2021      Staci Lopez  [address]  [place] [address]            Dear Staci,       I am writing to report that your included test results are within expected ranges. I do not suggest that we make any changes at this time.       Isael Pike M.D.       Results for orders placed or performed in visit on 01/11/21   SARS CoV2 Yessi IgG ([facility])     Status: Abnormal   Result Value Ref Range    DiaSorin SARS CoV2 Yessi IgG Positive (A) Negative    Narrative    Test(s) 164060-SARS-CoV-2 Antibody, IgG  has not been FDA cleared or approved. This test has  been authorized by FDA under an Emergency Use Authorization  (EUA). This test is only authorized for the duration of the  declaration that circumstances exist justifying the authorization  of emergency use of in vitro diagnostics for detection and/or  diagnosis of COVID-19 under Section 564(b)(1) of the Act, 21  U.S.C. 360bbb-3(b)(1), unless the authorization is terminated or  revoked sooner. This test has been authorized only for detecting  the presence of antibodies against SARS-CoV-2, not for any other  viruses or pathogens.  Performed at:  01 - [facility]  [address] [place] [address]  [id number]  : Ezra Turk MD, Phone:  [phone]

## 2021-01-11 NOTE — PROGRESS NOTES
Problem(s) Oriented visit        SUBJECTIVE:                                                    Staci Lopez is a 70 year old female who presents to clinic today for the following health issues :        1. Hypertension goal BP (blood pressure) < 140/80    - [place] ART PRESS WAVEFORM ANALYS CENTRAL ART PRESSURE    2. Elevated erythrocyte sedimentation rate    - predniSONE (DELTASONE) 5 MG tablet; Take 2 tablets (10 mg) by mouth daily  Dispense: 60 tablet; Refill: 4    3. PMR (polymyalgia rheumatica) (H)    - predniSONE (DELTASONE) 5 MG tablet; Take 2 tablets (10 mg) by mouth daily  Dispense: 60 tablet; Refill: 4    4. Special screening for malignant neoplasms, colon    - GASTROENTEROLOGY ADULT REF PROCEDURE ONLY; Future    5. Encounter for screening mammogram for breast cancer    - MAMMO -  Screening Digital Bilateral ([facility]); Future    6. 2019 novel coronavirus disease (COVID-19)    - SARS CoV2 Yessi IgG ([facility])       Problem list, Medication list, Allergies, and Medical/Social/Surgical histories reviewed in [facility] and updated as appropriate.   Additional history: as documented    ROS:  General and Resp. completed and negative except as noted above    Histories:   Patient Active Problem List   Diagnosis     Pure hypercholesterolemia     Hypertension goal BP (blood pressure) < 140/80     Hip pain     PMR (polymyalgia rheumatica) (H)     ACP (advance care planning)     Osteopenia     Past Surgical History:   Procedure Laterality Date     BREAST BIOPSY, RT/LT      x 2 - benign       Social History     Tobacco Use     Smoking status: Former Smoker     Types: Cigarettes     Quit date: 11/1/2010     Years since quitting: 10.2     Smokeless tobacco: Never Used   Substance Use Topics     Alcohol use: Yes     Alcohol/week: 7.0 standard drinks     Types: 7 Glasses of wine per week     Family History   Problem Relation Age of Onset     Arthritis Mother      Neurologic Disorder Paternal Grandmother          "bipolar     High cholesterol Brother      High cholesterol Brother      High cholesterol Brother      Other - See Comments Father         fall chest full on blood after fall in the hospital 6/5/18     Hyperlipidemia Sister         breast cancer     Cancer Maternal Grandmother      Parkinsonism Maternal Grandfather            OBJECTIVE:                                                    /61   Pulse 74   Temp 97.9  F (36.6  C) (Skin)   Resp 16   Ht 1.67 m (5' 5.75\")   Wt 65.7 kg (144 lb 12.8 oz)   SpO2 99%   BMI 23.55 kg/m    Body mass index is 23.55 kg/m .   APPEARANCE: = Relaxed and in no distress     ASSESSMENT/PLAN:                                                        Staci was seen today for recheck medication and blood draw.    Diagnoses and all orders for this visit:    Hypertension goal BP (blood pressure) < 140/80  Reviewed her current HTN management. Discussed our goal for her is a systolic pressure at or below 128 and diastolic pressure at or below 83.  We today managed her prescriptions with refills ensured to ensure availabilty of current medications.  Discussed the importance for aggressive management of HTN to prevent vascular complications later.  Recommended lower fat, lower carbohydrate, and lower sodium (<2000 mg)diet. Required intervals for follow up on HTN, lab studies reviewed.    Strongly recommened she follow her blood pressures outside the clinic to ensure that BPs are remaining within guidelines,.  Instructed to contact me if the readings are not within guidelines on a regular basis so we can adjust treatment as needed.    -     [place] ART PRESS WAVEFORM ANALYS CENTRAL ART PRESSURE    Elevated erythrocyte sedimentation rate  -     predniSONE (DELTASONE) 5 MG tablet; Take 2 tablets (10 mg) by mouth daily    PMR (polymyalgia rheumatica) (H)  Recheck level todayand  Potentially decrease the dose to 7.5  -     predniSONE (DELTASONE) 5 MG tablet; Take 2 tablets (10 mg) by mouth " daily    Special screening for malignant neoplasms, colon  -     GASTROENTEROLOGY ADULT REF PROCEDURE ONLY; Future    Encounter for screening mammogram for breast cancer  -     MAMMO -  Screening Digital Bilateral ([facility]); Future    2019 novel coronavirus disease (COVID-19)  Would like to check   -     SARS CoV2 Yessi IgG ([facility])        Regular exercise  There are no Patient Instructions on file for this visit.    The following health maintenance items are reviewed in Epic and correct as of today:  Health Maintenance   Topic Date Due     DTAP/TDAP/TD IMMUNIZATION (2 - Td) 10/16/2016     MEDICARE ANNUAL WELLNESS VISIT  02/16/2017     Pneumococcal Vaccine: 65+ Years (2 of 2 - PPSV23) 02/16/2017     ADVANCE CARE PLANNING  04/01/2018     COLORECTAL CANCER SCREENING  11/06/2020     PHQ-2  01/01/2021     ZOSTER IMMUNIZATION (2 of 2) 01/15/2020     MAMMO SCREENING  11/20/2020     FALL RISK ASSESSMENT  12/11/2021     DEXA  11/20/2022     LIPID  12/11/2025     HEPATITIS C SCREENING  Completed     INFLUENZA VACCINE  Completed     Pneumococcal Vaccine: Pediatrics (0 to 5 Years) and At-Risk Patients (6 to 64 Years)  Aged Out     IPV IMMUNIZATION  Aged Out     MENINGITIS IMMUNIZATION  Aged Out     HEPATITIS B IMMUNIZATION  Aged Out       Isael Pike MD  [facility]  Family Practice  [facility]  [phone]    For any issues my office # is [phone]

## 2021-01-12 LAB — DIASORIN SARS COV2 ABY IGG: POSITIVE

## 2021-01-14 NOTE — RESULT ENCOUNTER NOTE
Dear Staci,   I am writing to report that your included test results are within expected ranges. I do not suggest that we make any changes at this time.  Isael Pike M.D.

## 2021-01-14 NOTE — PROGRESS NOTES
Patient called on 01/13 looking for Sed Rate result.  This was not done at the visit time.  Patient upset as she states that is the reason she came in. Per [facility] lab this test can not be added on and patient will need to come back in.  Called patient back and lab scheduled.

## 2021-01-18 VITALS — TEMPERATURE: 98.1 F

## 2021-01-18 DIAGNOSIS — R70.0 ELEVATED ERYTHROCYTE SEDIMENTATION RATE: ICD-10-CM

## 2021-01-18 DIAGNOSIS — M35.3 PMR (POLYMYALGIA RHEUMATICA) (H): ICD-10-CM

## 2021-01-18 LAB — ERYTHROCYTE [SEDIMENTATION RATE] IN BLOOD: 12 MM/HR (ref 0–20)

## 2021-01-18 PROCEDURE — 85651 RBC SED RATE NONAUTOMATED: CPT | Performed by: FAMILY MEDICINE

## 2021-01-18 PROCEDURE — 36415 COLL VENOUS BLD VENIPUNCTURE: CPT | Performed by: FAMILY MEDICINE

## 2021-01-19 ENCOUNTER — TELEPHONE (OUTPATIENT)
Dept: FAMILY MEDICINE | Facility: CLINIC | Age: 71
End: 2021-01-19

## 2021-01-19 DIAGNOSIS — M35.3 PMR (POLYMYALGIA RHEUMATICA) (H): ICD-10-CM

## 2021-01-19 DIAGNOSIS — R70.0 ELEVATED ERYTHROCYTE SEDIMENTATION RATE: ICD-10-CM

## 2021-01-19 RX ORDER — PREDNISONE 5 MG/1
7.5 TABLET ORAL DAILY
Qty: 60 TABLET | Refills: 4
Start: 2021-01-19 | End: 2021-02-19

## 2021-01-19 NOTE — TELEPHONE ENCOUNTER
Per Dr. Pike, patient informed ESR=12 yesterday. As discussed at 1/11/21 visit, patient will reduce prednisone from 10mg to 7.5mg QD and plan to repeat ESR in 1 month (lab-only is okay). Call clinic sooner if pain increases with this dose change. Patient agrees.  Lorene Quiroga RN

## 2021-02-17 ENCOUNTER — TELEPHONE (OUTPATIENT)
Dept: FAMILY MEDICINE | Facility: CLINIC | Age: 71
End: 2021-02-17

## 2021-02-17 VITALS — TEMPERATURE: 97.8 F

## 2021-02-17 DIAGNOSIS — M35.3 PMR (POLYMYALGIA RHEUMATICA) (H): ICD-10-CM

## 2021-02-17 DIAGNOSIS — R70.0 ELEVATED ERYTHROCYTE SEDIMENTATION RATE: ICD-10-CM

## 2021-02-17 LAB — ERYTHROCYTE [SEDIMENTATION RATE] IN BLOOD: 11 MM/HR (ref 0–20)

## 2021-02-17 PROCEDURE — 85651 RBC SED RATE NONAUTOMATED: CPT | Performed by: FAMILY MEDICINE

## 2021-02-17 PROCEDURE — 36415 COLL VENOUS BLD VENIPUNCTURE: CPT | Performed by: FAMILY MEDICINE

## 2021-02-17 NOTE — LETTER
[facility] [address] [place]  [address]  Phone: [phone]    February 19, 2021      Staci Lopez  [address] [address]              Dear Staci,    I am writing to report that your included test results are within expected ranges. I do not suggest that we make any changes at this time.        Sincerely,     Isael Pike M.D.    Results for orders placed or performed in visit on 02/17/21   Sed Rate Westergren ([facility])     Status: None   Result Value Ref Range    Sed Rate 11 0 - 20 mm/hr

## 2021-02-18 ENCOUNTER — OFFICE VISIT (OUTPATIENT)
Dept: FAMILY MEDICINE | Facility: CLINIC | Age: 71
End: 2021-02-18

## 2021-02-18 VITALS
OXYGEN SATURATION: 99 % | SYSTOLIC BLOOD PRESSURE: 128 MMHG | WEIGHT: 146 LBS | HEART RATE: 96 BPM | RESPIRATION RATE: 16 BRPM | BODY MASS INDEX: 23.46 KG/M2 | HEIGHT: 66 IN | TEMPERATURE: 97.8 F | DIASTOLIC BLOOD PRESSURE: 78 MMHG

## 2021-02-18 DIAGNOSIS — N30.00 ACUTE CYSTITIS WITHOUT HEMATURIA: Primary | ICD-10-CM

## 2021-02-18 DIAGNOSIS — R39.9 LOWER URINARY TRACT SYMPTOMS (LUTS): ICD-10-CM

## 2021-02-18 LAB
BACTERIA URINE: ABNORMAL
BILIRUB UR QL STRIP: ABNORMAL
BLOOD URINE DIP: ABNORMAL
CASTS/LPF: ABNORMAL
COLOR UR: YELLOW
CRYSTAL URINE: ABNORMAL
EPITHELIAL CELLS - QUEST: ABNORMAL
GLUCOSE UR STRIP-MCNC: ABNORMAL MG/DL
KETONES UR QL STRIP: ABNORMAL
LEUKOCYTE ESTERASE URINE DIP: ABNORMAL
MUCOUS URINE: ABNORMAL
NITRITE UR QL STRIP: ABNORMAL
PH UR STRIP: 5.5 PH (ref 5–9)
PROT UR QL: ABNORMAL MG/DL (ref ?–0.01)
RBC URINE: ABNORMAL (ref 0–3)
SP GR UR STRIP: 1.01 (ref 1–1.02)
UROBILINOGEN UR QL STRIP: 0.2 EU/DL (ref 0.2–1)
WBC URINE: ABNORMAL (ref 0–3)

## 2021-02-18 PROCEDURE — 99213 OFFICE O/P EST LOW 20 MIN: CPT | Performed by: NURSE PRACTITIONER

## 2021-02-18 PROCEDURE — 81003 URINALYSIS AUTO W/O SCOPE: CPT | Performed by: NURSE PRACTITIONER

## 2021-02-18 RX ORDER — NITROFURANTOIN 25; 75 MG/1; MG/1
100 CAPSULE ORAL 2 TIMES DAILY
Qty: 10 CAPSULE | Refills: 0 | Status: SHIPPED | OUTPATIENT
Start: 2021-02-18 | End: 2021-02-23

## 2021-02-18 ASSESSMENT — MIFFLIN-ST. JEOR: SCORE: 1195.03

## 2021-02-18 NOTE — PATIENT INSTRUCTIONS
Macrobid 1 pill 2 times daily for 5 days    I will call you with culture results early next week    Call if you have any yeast type symptoms - itching, discharge, irritation    Drink lots of water

## 2021-02-18 NOTE — PROGRESS NOTES
"Problem(s) Oriented visit        SUBJECTIVE:                                                    Staci Lopez is a 70 year old female who presents to clinic today for the following health issues :    Symptoms started about 1 week ago and include: cloudy urine, irritating twinge at end of urination, odor after urinating this morning. Has had some frequency but drinking more water than usual. Denies hematuria, fever, chills, abdominal pain, flank pain, nausea, vomiting. Does not get frequent UTI's.     Problem list, Medication list, Allergies, and Medical/Social/Surgical histories reviewed in [facility] and updated as appropriate.   Additional history: as documented    ROS:  3 point ROS completed and negative except noted above, including Gen, GI,     OBJECTIVE:                                                    /78   Pulse 96   Temp 97.8  F (36.6  C)   Resp 16   Ht 1.67 m (5' 5.75\")   Wt 66.2 kg (146 lb)   SpO2 99%   BMI 23.74 kg/m    Body mass index is 23.74 kg/m .   GENERAL: healthy, alert and no distress  ABDOMEN: normal appearance    UA RESULTS:  Recent Labs   Lab Test 02/18/21   COLOR Yellow   SG 1.015   URINEPH 5.5   UROBILINOGEN 0.2   NITRITE Neg   RBCU 0-3   WBCU 15-20        ASSESSMENT/PLAN:                                                      Staci was seen today for urinary problem.    Diagnoses and all orders for this visit:    Acute cystitis without hematuria  -     Urine Culture  Routine ([facility])  -     nitroFURantoin macrocrystal-monohydrate (MACROBID) 100 MG capsule; Take 1 capsule (100 mg) by mouth 2 times daily for 5 days  Uncomplicated UTI based on the clinical information.    An antibiotic is indicated - prescription for Macrobid 100 mg BID x 5 days sent to pharmacy.  Discussed urinary tract infections, including risk factors (including sexual activity, bladder outlet obstruction issues, etc), prevention strategies, and basics of UTI management.   Drink fluids to produce " regular urination which can help prevent future UTIs.  If the symptoms are not cleared with the prescribed treatment, contact us for further evaluation.      Lower urinary tract symptoms (LUTS)  -     Urinalysis w/reflex protein, bili ([facility])        See Patient Instructions  Patient Instructions   Macrobid 1 pill 2 times daily for 5 days    I will call you with culture results early next week    Call if you have any yeast type symptoms - itching, discharge, irritation    Drink lots of water      JESSICA Cullen CNP  [facility]  Family Practice  [facility]  [phone]    For any issues my office # is [phone]

## 2021-02-18 NOTE — CONFIDENTIAL NOTE
Patient called requesting ESR result from today. She is feeling well on the pred 7.5mg QD for past month and would like to reduce her prednisone further if Dr. Pike thinks appropriate based on the ESR result.   Patient was started on pred 10mg QD 12/11/20 for presumed PMR when ESR=53.   1/18/21 dose reduced to 7.5mg QD with ESR at 12 and feeling well.       Component      Latest Ref Rng & Units 12/11/2020 1/18/2021 2/17/2021   Sed Rate      0 - 20 mm/hr 53 (A) 12 11       Plan: informed patient that ESR remains is stable and in normal range. We will speak with Dr. Pike to get his recommendations on taper and let patient know.   Lorene Quiroga RN      02/19/21 12:59 PM per Dr. Pike - decrease prednisone to 5mg QD and repeat ESR in 1 month to determine if further tapering appropriate. Call sooner if pain increasing. Patient informed.  Lorene Quiroga RN

## 2021-02-19 RX ORDER — PREDNISONE 5 MG/1
5 TABLET ORAL DAILY
Qty: 30 TABLET | Refills: 1
Start: 2021-02-19 | End: 2021-07-22

## 2021-02-22 LAB
ANTIMICROBIAL SUSCEPTIBILITY: ABNORMAL
Lab: ABNORMAL
URINE CULTURE: ABNORMAL

## 2021-03-20 ENCOUNTER — HEALTH MAINTENANCE LETTER (OUTPATIENT)
Age: 71
End: 2021-03-20

## 2021-04-26 VITALS — TEMPERATURE: 98.7 F

## 2021-04-26 DIAGNOSIS — M25.50 MULTIPLE JOINT PAIN: ICD-10-CM

## 2021-04-26 DIAGNOSIS — M85.80 OSTEOPENIA, UNSPECIFIED LOCATION: ICD-10-CM

## 2021-04-26 DIAGNOSIS — M35.3 PMR (POLYMYALGIA RHEUMATICA) (H): Primary | ICD-10-CM

## 2021-04-26 LAB
% GRANULOCYTES: 51.4 % (ref 42.2–75.2)
ERYTHROCYTE [SEDIMENTATION RATE] IN BLOOD: 15 MM/HR (ref 0–20)
HCT VFR BLD AUTO: 36.4 % (ref 35–46)
HEMOGLOBIN: 12.6 G/DL (ref 11.8–15.5)
LYMPHOCYTES NFR BLD AUTO: 40.1 % (ref 20.5–51.1)
MCH RBC QN AUTO: 32.8 PG (ref 27–31)
MCHC RBC AUTO-ENTMCNC: 34.6 G/DL (ref 33–37)
MCV RBC AUTO: 94.8 FL (ref 80–100)
MONOCYTES NFR BLD AUTO: 8.5 % (ref 1.7–9.3)
PLATELET # BLD AUTO: 269 K/UL (ref 140–450)
RBC # BLD AUTO: 3.84 X10/CMM (ref 3.7–5.2)
WBC # BLD AUTO: 4.6 X10/CMM (ref 3.8–11)

## 2021-04-26 PROCEDURE — 85651 RBC SED RATE NONAUTOMATED: CPT | Performed by: FAMILY MEDICINE

## 2021-04-26 PROCEDURE — 36415 COLL VENOUS BLD VENIPUNCTURE: CPT | Performed by: FAMILY MEDICINE

## 2021-04-26 PROCEDURE — 85025 COMPLETE CBC W/AUTO DIFF WBC: CPT | Performed by: FAMILY MEDICINE

## 2021-04-26 NOTE — LETTER
[facility] [address] [place]  [address]  Phone: [phone]    April 30, 2021      Staci Lopez  [address] [place] [address]          Dear Staci,       I am writing to report that your included test results are within expected ranges. I do not suggest that we make any changes at this time.       Isael Pike M.D.         Results for orders placed or performed in visit on 04/26/21   CBC with Diff/Plt ([facility]     Status: Abnormal   Result Value Ref Range    WBC x10/cmm 4.6 3.8 - 11.0 x10/cmm    % Lymphocytes 40.1 20.5 - 51.1 %    % Monocytes 8.5 1.7 - 9.3 %    % Granulocytes 51.4 42.2 - 75.2 %    RBC x10/cmm 3.84 3.7 - 5.2 x10/cmm    Hemoglobin 12.6 11.8 - 15.5 g/dl    Hematocrit 36.4 35 - 46 %    MCV 94.8 80 - 100 fL    MCH 32.8 (A) 27.0 - 31.0 pg    MCHC 34.6 33.0 - 37.0 g/dL    Platelet Count 269 140 - 450 K/uL   Sed Rate Leslie ([facility])     Status: None   Result Value Ref Range    Sed Rate 15 0 - 20 mm/hr

## 2021-07-17 DIAGNOSIS — Z76.0 ENCOUNTER FOR MEDICATION REFILL: ICD-10-CM

## 2021-07-17 DIAGNOSIS — I10 HYPERTENSION GOAL BP (BLOOD PRESSURE) < 140/80: ICD-10-CM

## 2021-07-17 RX ORDER — ATORVASTATIN CALCIUM 20 MG/1
TABLET, FILM COATED ORAL
Qty: 90 TABLET | Refills: 3 | Status: SHIPPED | OUTPATIENT
Start: 2021-07-17 | End: 2022-07-07

## 2021-07-17 RX ORDER — AMLODIPINE BESYLATE 10 MG/1
TABLET ORAL
Qty: 90 TABLET | Refills: 3 | Status: SHIPPED | OUTPATIENT
Start: 2021-07-17 | End: 2022-07-07

## 2021-07-22 DIAGNOSIS — R70.0 ELEVATED ERYTHROCYTE SEDIMENTATION RATE: ICD-10-CM

## 2021-07-22 DIAGNOSIS — M35.3 PMR (POLYMYALGIA RHEUMATICA) (H): ICD-10-CM

## 2021-07-22 NOTE — TELEPHONE ENCOUNTER
Prednisone. Last addressed 4/26/21.         02/19/21 12:59 PM per Dr. Pike - decrease prednisone to 5mg QD and repeat ESR in 1 month to determine if further tapering appropriate. Call sooner if pain increasing. Patient informed.    4/26/21    Ref Range & Units 2 mo ago    Sed Rate 0 - 20 mm/hr 15

## 2021-07-23 RX ORDER — PREDNISONE 5 MG/1
5 TABLET ORAL DAILY
Qty: 30 TABLET | Refills: 0 | Status: SHIPPED | OUTPATIENT
Start: 2021-07-23 | End: 2021-12-30

## 2021-09-04 ENCOUNTER — HEALTH MAINTENANCE LETTER (OUTPATIENT)
Age: 71
End: 2021-09-04

## 2021-11-04 ENCOUNTER — HOSPITAL ENCOUNTER (OUTPATIENT)
Dept: MAMMOGRAPHY | Facility: CLINIC | Age: 71
Discharge: HOME | End: 2021-11-04
Attending: FAMILY MEDICINE | Admitting: FAMILY MEDICINE
Payer: COMMERCIAL

## 2021-11-04 DIAGNOSIS — Z12.31 ENCOUNTER FOR SCREENING MAMMOGRAM FOR BREAST CANCER: ICD-10-CM

## 2021-11-04 PROCEDURE — 77067 SCR MAMMO BI INCL CAD: CPT

## 2021-12-30 DIAGNOSIS — M35.3 PMR (POLYMYALGIA RHEUMATICA) (H): ICD-10-CM

## 2021-12-30 DIAGNOSIS — R70.0 ELEVATED ERYTHROCYTE SEDIMENTATION RATE: ICD-10-CM

## 2021-12-30 RX ORDER — PREDNISONE 5 MG/1
TABLET ORAL
Qty: 30 TABLET | Refills: 0 | Status: SHIPPED | OUTPATIENT
Start: 2021-12-30 | End: 2022-07-29

## 2022-01-19 ENCOUNTER — OFFICE VISIT (OUTPATIENT)
Dept: FAMILY MEDICINE | Facility: CLINIC | Age: 72
End: 2022-01-19

## 2022-01-19 VITALS
DIASTOLIC BLOOD PRESSURE: 72 MMHG | WEIGHT: 144 LBS | RESPIRATION RATE: 16 BRPM | OXYGEN SATURATION: 99 % | SYSTOLIC BLOOD PRESSURE: 118 MMHG | BODY MASS INDEX: 23.14 KG/M2 | HEART RATE: 76 BPM | HEIGHT: 66 IN

## 2022-01-19 DIAGNOSIS — Z12.11 COLON CANCER SCREENING: ICD-10-CM

## 2022-01-19 DIAGNOSIS — H65.03 NON-RECURRENT ACUTE SEROUS OTITIS MEDIA OF BOTH EARS: Primary | ICD-10-CM

## 2022-01-19 DIAGNOSIS — H61.23 BILATERAL IMPACTED CERUMEN: ICD-10-CM

## 2022-01-19 DIAGNOSIS — Z23 NEED FOR PNEUMOCOCCAL VACCINATION: ICD-10-CM

## 2022-01-19 DIAGNOSIS — M35.3 PMR (POLYMYALGIA RHEUMATICA) (H): ICD-10-CM

## 2022-01-19 LAB — ERYTHROCYTE [SEDIMENTATION RATE] IN BLOOD: 38 MM/HR (ref 0–20)

## 2022-01-19 PROCEDURE — 85651 RBC SED RATE NONAUTOMATED: CPT | Performed by: NURSE PRACTITIONER

## 2022-01-19 PROCEDURE — 36415 COLL VENOUS BLD VENIPUNCTURE: CPT | Performed by: NURSE PRACTITIONER

## 2022-01-19 PROCEDURE — 69209 REMOVE IMPACTED EAR WAX UNI: CPT | Mod: 50 | Performed by: NURSE PRACTITIONER

## 2022-01-19 PROCEDURE — 99214 OFFICE O/P EST MOD 30 MIN: CPT | Mod: 25 | Performed by: NURSE PRACTITIONER

## 2022-01-19 RX ORDER — PREDNISONE 1 MG/1
TABLET ORAL
Qty: 280 TABLET | Refills: 0 | Status: SHIPPED | OUTPATIENT
Start: 2022-01-19 | End: 2022-05-11

## 2022-01-19 RX ORDER — PREDNISONE 1 MG/1
TABLET ORAL DAILY
Status: CANCELLED | OUTPATIENT
Start: 2022-01-19

## 2022-01-19 ASSESSMENT — MIFFLIN-ST. JEOR: SCORE: 1180.96

## 2022-01-19 NOTE — PROGRESS NOTES
"Problem(s) Oriented visit        SUBJECTIVE:                                                    Staci Lopez is a 71 year old female who presents to clinic today for the following health issues :    Had a cold about a week and a half ago. Had sore throat, congestion, ears felt full. Still having some muffled hearing, but improved yesterday and today, using steam which has been helpful. No fevers, chills, ocular pain, headaches, neck stiffness, or dental pain.    PMR flared after COVID infection, restarted prednisone 5mg daily and has been on this for close to a year. The bilateral shoulders, neck, hips were painful- symptoms have resolved and she would like to discontinue prednisone. Denies s/sxs giant cell arteritis.    Problem list, Medication list, Allergies, and Medical/Social/Surgical histories reviewed in EPIC and updated as appropriate.   Additional history: as documented    ROS:  Gen, HEENT, resp, neuro, MSK negative except as listed per HPI      OBJECTIVE:                                                    Physical Exam:    /72   Pulse 76   Resp 16   Ht 1.67 m (5' 5.75\")   Wt 65.3 kg (144 lb)   SpO2 99%   BMI 23.42 kg/m      CONSTITUTIONAL: Alert non-toxic appearing female in no acute distress  HEENT: Normocephalic, atraumatic. PERRLA, no scleral icterus or injection, EOMI intact. EACs impacted with cerumen bilaterally- s/p irrigation, EACs clear bilaterally, TMs pearly gray and intact with serous effusion bilaterally; nares patent without ulceration or edema- moderate amount of clear rhinorrhea; oropharynx pink without lesions; posterior pharynx without exudates; neck supple without adenopathy. No temporal thrill or bruit, no tenderness over the temples.  RESPIRATORY: Lungs clear to auscultation, respirations unlabored  CV: Regular rate and rhythm, S1S2, no clicks, murmurs, rubs, or gallops  NEUROLOGIC: No gross deficits  PSYCHIATRIC: Pleasant and interactive, affect euthymic, makes " appropriate eye contact, thought process logical       ASSESSMENT/PLAN:                                                          Staci was seen today for sinus problem and recheck medication.    Diagnoses and all orders for this visit:    Non-recurrent acute serous otitis media of both ears  Bilateral impacted cerumen  -     Removal Impacted Cerumen Irrigation Unilateral (Ear Wash)  -     VENOUS COLLECTION    Symptoms improved with cerumen removal. Serous effusion noted bilaterally, reviewed symptomatic cares and when to seek further care. To follow up if symptoms worsen or fail to improve.    PMR (polymyalgia rheumatica) (H)  -     Sed Rate Westergren ([facility])  -     VENOUS COLLECTION  -     predniSONE (DELTASONE) 1 MG tablet; Take 4 tablets (4 mg) by mouth daily for 28 days, THEN 3 tablets (3 mg) daily for 28 days, THEN 2 tablets (2 mg) daily for 28 days, THEN 1 tablet (1 mg) daily for 28 days.    Symptoms resolved, would like to taper prednisone. Reviewed prednisone taper, s/sxs flare and GCA, when to seek further care.    Colon cancer screening  -     Adult Gastro Ref - Procedure Only; Future    Need for pneumococcal vaccination  -     Cancel: PPSV23, IM/SUBQ (2+ YRS) - [id number]                The following health maintenance items are reviewed in Epic and correct as of today:  Health Maintenance   Topic Date Due     LUNG CANCER SCREENING  Never done     DTAP/TDAP/TD IMMUNIZATION (2 - Td or Tdap) 10/16/2016     MEDICARE ANNUAL WELLNESS VISIT  02/16/2017     Pneumococcal Vaccine: 65+ Years (2 of 2 - PPSV23) 02/16/2017     ADVANCE CARE PLANNING  04/01/2018     ZOSTER IMMUNIZATION (2 of 2) 01/15/2020     COLORECTAL CANCER SCREENING  11/06/2020     FALL RISK ASSESSMENT  12/11/2021     MAMMO SCREENING  11/04/2022     DEXA  11/20/2022     LIPID  12/11/2025     HEPATITIS C SCREENING  Completed     PHQ-2  Completed     INFLUENZA VACCINE  Completed     COVID-19 Vaccine  Completed     IPV IMMUNIZATION  Aged Out      MENINGITIS IMMUNIZATION  Aged Out     HEPATITIS B IMMUNIZATION  Aged Out       Patient Instructions   You can try Afrin twice daily for three days for the hearing and sinuses- sinus irrigation with a neti pot, Nasaline, or nasal saline may be helpful afterward.   Warm compresses may be helpful as well  Guaifenesin may be used to thin secretions- you need to drink a lot of water with this    For the PMR:  Prednisone 4mg once daily x4 weeks  3mg once daily x4 weeks  2mg once daily x4 weeks  1mg once daily x4 weeks  Then off of prednisone unless you have symptoms of a flare          JESSICA Garcia CNP  [facility]  [phone]    For any issues my office # is [phone]

## 2022-01-19 NOTE — PATIENT INSTRUCTIONS
You can try Afrin twice daily for three days for the hearing and sinuses- sinus irrigation with a neti pot, Nasaline, or nasal saline may be helpful afterward.   Warm compresses may be helpful as well  Guaifenesin may be used to thin secretions- you need to drink a lot of water with this    For the PMR:  Prednisone 4mg once daily x4 weeks  3mg once daily x4 weeks  2mg once daily x4 weeks  1mg once daily x4 weeks  Then off of prednisone unless you have symptoms of a flare

## 2022-04-16 ENCOUNTER — HEALTH MAINTENANCE LETTER (OUTPATIENT)
Age: 72
End: 2022-04-16

## 2022-05-05 ENCOUNTER — OFFICE VISIT (OUTPATIENT)
Dept: FAMILY MEDICINE | Facility: CLINIC | Age: 72
End: 2022-05-05

## 2022-05-05 VITALS
RESPIRATION RATE: 18 BRPM | HEART RATE: 89 BPM | TEMPERATURE: 97.9 F | HEIGHT: 66 IN | WEIGHT: 151 LBS | DIASTOLIC BLOOD PRESSURE: 51 MMHG | BODY MASS INDEX: 24.27 KG/M2 | SYSTOLIC BLOOD PRESSURE: 130 MMHG | OXYGEN SATURATION: 97 %

## 2022-05-05 DIAGNOSIS — M81.0 AGE-RELATED OSTEOPOROSIS WITHOUT CURRENT PATHOLOGICAL FRACTURE: ICD-10-CM

## 2022-05-05 DIAGNOSIS — M35.3 PMR (POLYMYALGIA RHEUMATICA) (H): Primary | ICD-10-CM

## 2022-05-05 LAB
% GRANULOCYTES: 67 % (ref 42.2–75.2)
ERYTHROCYTE [SEDIMENTATION RATE] IN BLOOD: 14 MM/HR (ref 0–20)
HCT VFR BLD AUTO: 37.4 % (ref 35–46)
HEMOGLOBIN: 12.3 G/DL (ref 11.8–15.5)
LYMPHOCYTES NFR BLD AUTO: 25.9 % (ref 20.5–51.1)
MCH RBC QN AUTO: 32.4 PG (ref 27–31)
MCHC RBC AUTO-ENTMCNC: 33 G/DL (ref 33–37)
MCV RBC AUTO: 98.2 FL (ref 80–100)
MONOCYTES NFR BLD AUTO: 7.1 % (ref 1.7–9.3)
PLATELET # BLD AUTO: 278 K/UL (ref 140–450)
RBC # BLD AUTO: 3.81 X10/CMM (ref 3.7–5.2)
WBC # BLD AUTO: 5.9 X10/CMM (ref 3.8–11)

## 2022-05-05 PROCEDURE — 85025 COMPLETE CBC W/AUTO DIFF WBC: CPT | Performed by: NURSE PRACTITIONER

## 2022-05-05 PROCEDURE — 99214 OFFICE O/P EST MOD 30 MIN: CPT | Performed by: NURSE PRACTITIONER

## 2022-05-05 PROCEDURE — 85651 RBC SED RATE NONAUTOMATED: CPT | Performed by: NURSE PRACTITIONER

## 2022-05-05 RX ORDER — PREDNISONE 5 MG/1
TABLET ORAL
Qty: 158 TABLET | Refills: 0 | Status: SHIPPED | OUTPATIENT
Start: 2022-05-05 | End: 2022-05-31

## 2022-05-05 NOTE — PROGRESS NOTES
"Problem(s) Oriented visit        SUBJECTIVE:                                                    Staci Lopez is a 71 year old female who presents today for the following:    CC: Upper back, shoulder, neck pain    Staci has had an acute worsening of upper back, shoulder, and neck aching. She is also noticing it in her upper thighs. Most noticeable when she's rolling over in bed, though does bother her throughout the day. She has a history of PMR and is currently on a prednisone taper- was able to get down to 1-2mg and then these symptoms started. These symptoms are consistent with previous flares of PMR. She denies vision changes, headaches, sore temples. No fevers or chills. No recent injury. She is under increased stress right now.    Tolerates prednisone well without adverse effects. Last DEXA 2019, osteoporosis in R hip. Not on therapy for this, but takes calcium and vitamin D.      ROS:  Gen, HEENT, MSK, skin negative except as listed per HPI      OBJECTIVE:                                                    Physical Exam:    /51   Pulse 89   Temp 97.9  F (36.6  C) (Temporal)   Resp 18   Ht 1.67 m (5' 5.75\")   Wt 68.5 kg (151 lb)   SpO2 97%   BMI 24.56 kg/m      CONSTITUTIONAL: Alert non-toxic appearing female in no acute distress  HEENT: Sclera without icterus or injection. No temporal bruit or thrill noted.  RESPIRATORY: Lungs clear to auscultation, respirations unlabored  CV: Regular rate and rhythm, S1S2, no clicks, murmurs, rubs, or gallops  MSK: Tenderness to the shoulder, trapezius, and base of the neck. Tenderness over the thighs. No focal bony tenderness. Trapezius tension bilaterally.  NEUROLOGIC: No gross deficits  PSYCHIATRIC: Pleasant and interactive, affect euthymic, makes appropriate eye contact, thought process logical       ASSESSMENT/PLAN:                                                          Staci was seen today for recheck medication.    Diagnoses and all orders " for this visit:    PMR (polymyalgia rheumatica) (H)  -     Sed Rate Westergren ([facility])  -     C-Reactive Protein  Quant ([facility])  -     Comp. Metabolic Panel (14) ([facility])  -     predniSONE (DELTASONE) 5 MG tablet; Take 3 tablets (15 mg) by mouth daily for 21 days, THEN 2.5 tablets (12.5 mg) daily for 21 days, THEN 2 tablets (10 mg) daily for 21 days.  -     CBC with Diff/Plt ([facility])    Symptoms consistent with flare of PMR, though could possibly be related to tight trapezius muscles as well. Restart higher prednisone dosage- see above for schedule. To update me in one week with her progress, then to see me in clinic in about 2-3 weeks for lab recheck. No evidence of GCA. Reviewed side effects of medications, alarm signs and symptoms, and when to seek further care.    ADDENDUM: ESR WNL- will check CRP. If normal, will trial NSAIDs/ice/PT if needed for trapezius tension.    Age-related osteoporosis without current pathological fracture  -     DEXA - Hip/Pelvis/Spine (FUTURE/[facility]); Future              Patient Instructions   Call the office to let me know how you're doing next week    We should have another visit in 2-4 weeks to recheck the sed rate and see how you're doing    Prednisone 15mg daily for three week  Then 12.5mg daily for three weeks  Then 10mg daily for three weeks  We will go from there after that    Bone density test- keep up the calcium and vitamin D      JESSICA Garcia CNP  [facility]  [facility]  [phone]    For any issues my office # is [phone]

## 2022-05-05 NOTE — PATIENT INSTRUCTIONS
Call the office to let me know how you're doing next week    We should have another visit in 2-4 weeks to recheck the sed rate and see how you're doing    Prednisone 15mg daily for three week  Then 12.5mg daily for three weeks  Then 10mg daily for three weeks  We will go from there after that    Bone density test- keep up the calcium and vitamin D

## 2022-05-06 ENCOUNTER — TELEPHONE (OUTPATIENT)
Dept: FAMILY MEDICINE | Facility: CLINIC | Age: 72
End: 2022-05-06

## 2022-05-06 DIAGNOSIS — M54.2 NECK PAIN: ICD-10-CM

## 2022-05-06 DIAGNOSIS — M54.9 UPPER BACK PAIN: Primary | ICD-10-CM

## 2022-05-06 LAB
ALBUMIN SERPL-MCNC: 4.5 G/DL (ref 3.7–4.7)
ALBUMIN/GLOB SERPL: 2.5 {RATIO} (ref 1.2–2.2)
ALP SERPL-CCNC: 91 IU/L (ref 44–121)
ALT SERPL-CCNC: 15 IU/L (ref 0–32)
AST SERPL-CCNC: 26 IU/L (ref 0–40)
BILIRUB SERPL-MCNC: 0.3 MG/DL (ref 0–1.2)
BUN SERPL-MCNC: 26 MG/DL (ref 8–27)
BUN/CREATININE RATIO: 24 (ref 12–28)
CALCIUM SERPL-MCNC: 9.2 MG/DL (ref 8.7–10.3)
CHLORIDE SERPLBLD-SCNC: 99 MMOL/L (ref 96–106)
CREAT SERPL-MCNC: 1.08 MG/DL (ref 0.57–1)
CRP SERPL-MCNC: 3 MG/L (ref 0–10)
EGFR: 55 ML/MIN/1.73
GLOBULIN, TOTAL: 1.8 G/DL (ref 1.5–4.5)
GLUCOSE SERPL-MCNC: 124 MG/DL (ref 65–99)
POTASSIUM SERPL-SCNC: 3.8 MMOL/L (ref 3.5–5.2)
PROT SERPL-MCNC: 6.3 G/DL (ref 6–8.5)
SODIUM SERPL-SCNC: 140 MMOL/L (ref 134–144)
TOTAL CO2: 24 MMOL/L (ref 20–29)

## 2022-05-06 RX ORDER — TIZANIDINE 2 MG/1
2 TABLET ORAL
Qty: 10 TABLET | Refills: 0 | Status: SHIPPED | OUTPATIENT
Start: 2022-05-06 | End: 2022-05-15

## 2022-05-06 NOTE — TELEPHONE ENCOUNTER
Patient calls back and is open to Renetta's recommendations for PT. Sleep is not great at baseline and is somewhat more disrupted with this pain. Would like to try the tizanidine at hs.   Plan: PT referral placed in [facility] for [facility]. Patient will await their call to schedule. Rx for tizanidine sent to pharmacy. Patient will not start the prednisone that was rx'd yesterday. Patient will call if symptoms worsen or not improving.  Lorene Quiroga RN

## 2022-05-06 NOTE — TELEPHONE ENCOUNTER
Left message to call nurse regarding results and Renetta's recommendations.   Does look like patient may have seen Renetta's result note on [facility] portal also.   Will await call back.  Lorene Quiroga RN

## 2022-05-06 NOTE — TELEPHONE ENCOUNTER
----- Message from JESSICA Garcia CNP sent at 5/6/2022  9:32 AM CDT -----  Staci's CRP and ESR are BOTH normal- I'm wondering if her shoulder/neck discomfort is due to her very tight trapezius muscles rather than a true reoccurrence of her PMR. I'd recommend she see physical therapy rather than starting the high doses of prednisone again. Could you call her and ask if she'd be open to PT? We could also try a little tizanidine 2mg at HS if the tightness is bothering her at night.  Thanks,  GOPI

## 2022-05-14 DIAGNOSIS — M54.9 UPPER BACK PAIN: ICD-10-CM

## 2022-05-14 DIAGNOSIS — M54.2 NECK PAIN: ICD-10-CM

## 2022-05-15 RX ORDER — TIZANIDINE 2 MG/1
TABLET ORAL
Qty: 10 TABLET | Refills: 0 | Status: SHIPPED | OUTPATIENT
Start: 2022-05-15 | End: 2022-10-14

## 2022-05-26 ENCOUNTER — OFFICE VISIT (OUTPATIENT)
Dept: FAMILY MEDICINE | Facility: CLINIC | Age: 72
End: 2022-05-26

## 2022-05-26 VITALS
DIASTOLIC BLOOD PRESSURE: 61 MMHG | HEART RATE: 88 BPM | BODY MASS INDEX: 23.82 KG/M2 | RESPIRATION RATE: 18 BRPM | WEIGHT: 148.2 LBS | OXYGEN SATURATION: 98 % | HEIGHT: 66 IN | SYSTOLIC BLOOD PRESSURE: 124 MMHG

## 2022-05-26 DIAGNOSIS — M35.3 PMR (POLYMYALGIA RHEUMATICA) (H): Primary | ICD-10-CM

## 2022-05-26 DIAGNOSIS — M15.0 PRIMARY OSTEOARTHRITIS INVOLVING MULTIPLE JOINTS: ICD-10-CM

## 2022-05-26 DIAGNOSIS — Z23 NEED FOR PNEUMOCOCCAL VACCINATION: ICD-10-CM

## 2022-05-26 LAB
% GRANULOCYTES: 70.1 % (ref 42.2–75.2)
ERYTHROCYTE [SEDIMENTATION RATE] IN BLOOD: 36 MM/HR (ref 0–20)
HCT VFR BLD AUTO: 35.6 % (ref 35–46)
HEMOGLOBIN: 11.8 G/DL (ref 11.8–15.5)
LYMPHOCYTES NFR BLD AUTO: 22.8 % (ref 20.5–51.1)
MCH RBC QN AUTO: 31.8 PG (ref 27–31)
MCHC RBC AUTO-ENTMCNC: 33.1 G/DL (ref 33–37)
MCV RBC AUTO: 96.2 FL (ref 80–100)
MONOCYTES NFR BLD AUTO: 7.1 % (ref 1.7–9.3)
PLATELET # BLD AUTO: 294 K/UL (ref 140–450)
RBC # BLD AUTO: 3.7 X10/CMM (ref 3.7–5.2)
WBC # BLD AUTO: 5.2 X10/CMM (ref 3.8–11)

## 2022-05-26 PROCEDURE — G0009 ADMIN PNEUMOCOCCAL VACCINE: HCPCS | Mod: 59 | Performed by: FAMILY MEDICINE

## 2022-05-26 PROCEDURE — 85651 RBC SED RATE NONAUTOMATED: CPT | Performed by: FAMILY MEDICINE

## 2022-05-26 PROCEDURE — 85025 COMPLETE CBC W/AUTO DIFF WBC: CPT | Performed by: FAMILY MEDICINE

## 2022-05-26 PROCEDURE — 36415 COLL VENOUS BLD VENIPUNCTURE: CPT | Performed by: FAMILY MEDICINE

## 2022-05-26 PROCEDURE — 90732 PPSV23 VACC 2 YRS+ SUBQ/IM: CPT | Performed by: FAMILY MEDICINE

## 2022-05-26 PROCEDURE — 99213 OFFICE O/P EST LOW 20 MIN: CPT | Mod: 25 | Performed by: FAMILY MEDICINE

## 2022-05-26 NOTE — PROGRESS NOTES
Neck shoulders knees and hips are sore painful and stiff in the am , by mid morning feels nrl.  Bed time feels nrl.  Awakens again and starts all over.    Did start collagen a little while ago prior to the start....    Has OA of multiple joints,  Takes alleve q am.    Never did start the increase the prednisone as the ESR was nrl.    Is completely off the prednisone    Problem(s) Oriented visit      ROS:  General and Resp. completed and negative except as noted above     HISTORY:   reports current alcohol use of about 7.0 standard drinks of alcohol per week.   reports that she quit smoking about 11 years ago. Her smoking use included cigarettes. She has never used smokeless tobacco.    Past Medical History:   Diagnosis Date     ACP (advance care planning)      Colon adenoma 2009     Hyperlipidemia LDL goal < 130      Hypertension goal BP (blood pressure) < 140/80      Osteopenia      PMR (polymyalgia rheumatica) (H)      Pure hypercholesterolemia      Past Surgical History:   Procedure Laterality Date     BREAST BIOPSY, RT/LT      x 2 - benign       EXAM:  BP: 124/61   Pulse: 88    Temp: Data Unavailable    Wt Readings from Last 2 Encounters:   05/26/22 67.2 kg (148 lb 3.2 oz)   05/05/22 68.5 kg (151 lb)       BMI= Body mass index is 24.1 kg/m .    EXAM:  APPEARANCE: = Relaxed and in no distress  Resp effort = Calm regular breathing  Digits and Nails = FROM in all finger joints, no nail dystrophy  Ext (edema) = No pretibial edema noted or elsewhere  Recent/Remote Memory = Alert and Oriented x 3      Assessment/Plan:  Staci was seen today for recheck.    Diagnoses and all orders for this visit:    PMR (polymyalgia rheumatica) (H)  -     CBC with Diff/Plt ([facility])  -     Comp. Metabolic Panel (14) ([facility])  -     Sed Rate Westergren ([facility])  -     C-Reactive Protein  Quant ([facility])  -     Lyme  Total Ab Test/Reflex ([facility])    Primary osteoarthritis involving multiple joints    Discussed the pathophysiology, typical  "presentations and basic management principles of osteoarthritis with the pt.  If they have not already tried scheduled Tylenol dosing, then will start there. IF that did not work then will progress with prn or scheuled OTC NSAIDs.  If NSAIDs contraindicated or cause GI side effects, then will move to RAIN II agents.  Discussed the potential side effects of these medications with the pt including lvier toxicity, renal dysfunction, GI bleeding, GI upset, brusing, bledding, etc.      COUNSELING:   reports that she quit smoking about 11 years ago. Her smoking use included cigarettes. She has never used smokeless tobacco.    Estimated body mass index is 24.1 kg/m  as calculated from the following:    Height as of this encounter: 1.67 m (5' 5.75\").    Weight as of this encounter: 67.2 kg (148 lb 3.2 oz).       Appropriate preventive services were discussed with this patient, including applicable screening as appropriate for cardiovascular disease, diabetes, osteopenia/osteoporosis, and glaucoma.  As appropriate for age/gender, discussed screening for colorectal cancer, prostate cancer, breast cancer, and cervical cancer. Checklist reviewing preventive services available has been given to the patient.    Reviewed patients plan of care and provided an AVS. The Basic Care Plan (routine screening as documented in Health Maintenance) for Staci meets the Care Plan requirement. This Care Plan has been established and reviewed with the  Patient.      The following health maintenance items are reviewed in Epic and correct as of today:  Health Maintenance   Topic Date Due     LUNG CANCER SCREENING  Never done     DTAP/TDAP/TD IMMUNIZATION (2 - Td or Tdap) 10/16/2016     MEDICARE ANNUAL WELLNESS VISIT  02/16/2017     Pneumococcal Vaccine: 65+ Years (2 - PPSV23 or PCV20) 02/16/2017     ADVANCE CARE PLANNING  04/01/2018     ZOSTER IMMUNIZATION (2 of 2) 01/15/2020     COLORECTAL CANCER SCREENING  11/06/2020     FALL RISK ASSESSMENT  " 12/11/2021     COVID-19 Vaccine (4 - Booster for Pfizer series) 04/10/2022     MAMMO SCREENING  11/04/2022     DEXA  11/20/2022     LIPID  12/11/2025     HEPATITIS C SCREENING  Completed     PHQ-2 (once per calendar year)  Completed     INFLUENZA VACCINE  Completed     IPV IMMUNIZATION  Aged Out     MENINGITIS IMMUNIZATION  Aged Out     HEPATITIS B IMMUNIZATION  Aged Out       Isael Pike  [facility]  For any issues my office # is [phone]

## 2022-05-29 LAB
ALBUMIN SERPL-MCNC: 4.3 G/DL (ref 3.7–4.7)
ALBUMIN/GLOB SERPL: 2.2 {RATIO} (ref 1.2–2.2)
ALP SERPL-CCNC: 91 IU/L (ref 44–121)
ALT SERPL-CCNC: 16 IU/L (ref 0–32)
AST SERPL-CCNC: 21 IU/L (ref 0–40)
BILIRUB SERPL-MCNC: 0.3 MG/DL (ref 0–1.2)
BUN SERPL-MCNC: 22 MG/DL (ref 8–27)
BUN/CREATININE RATIO: 24 (ref 12–28)
CALCIUM SERPL-MCNC: 9.2 MG/DL (ref 8.7–10.3)
CHLORIDE SERPLBLD-SCNC: 101 MMOL/L (ref 96–106)
CREAT SERPL-MCNC: 0.9 MG/DL (ref 0.57–1)
CRP SERPL-MCNC: 17 MG/L (ref 0–10)
EGFR: 68 ML/MIN/1.73
GLOBULIN, TOTAL: 2 G/DL (ref 1.5–4.5)
GLUCOSE SERPL-MCNC: 108 MG/DL (ref 65–99)
LYME TOTAL ANTIBODY EIA: NEGATIVE
POTASSIUM SERPL-SCNC: 3.9 MMOL/L (ref 3.5–5.2)
PROT SERPL-MCNC: 6.3 G/DL (ref 6–8.5)
SODIUM SERPL-SCNC: 141 MMOL/L (ref 134–144)
TOTAL CO2: 23 MMOL/L (ref 20–29)

## 2022-05-31 DIAGNOSIS — M35.3 PMR (POLYMYALGIA RHEUMATICA) (H): ICD-10-CM

## 2022-05-31 RX ORDER — PREDNISONE 5 MG/1
TABLET ORAL
Qty: 158 TABLET | Refills: 0 | Status: SHIPPED | OUTPATIENT
Start: 2022-05-31 | End: 2022-08-02

## 2022-05-31 NOTE — PROGRESS NOTES
"Patient calls reporting pain discussed at 5/26/22 visit with Dr. Pike is no better and saw on mychart that her \"PMR labs are elevated\". Patient would like medication ordered asap. Has used prednisone in past but is not currently taking.       Component Sed Rate C-Reactive Protein   Latest Ref Rng & Units 0 - 20 mm/hr 0 - 10 mg/L   12/11/2020 53 (A)    1/18/2021 12    2/17/2021 11    4/26/2021 15    1/19/2022 38 (A)    5/5/2022 14 3   5/26/2022 36 (A) 17 (H)     Per Dr. Pike - rx sent to pharmacy for prednisone and patient to follow up in 1 month or sooner prn.   predniSONE (DELTASONE) 5 MG tablet 158 tablet 0 5/31/2022 8/2/2022 No   Sig - Route: Take 3 tablets (15 mg) by mouth daily for 21 days, THEN 2.5 tablets (12.5 mg) daily for 21 days, THEN 2 tablets (10 mg) daily for 21 days. -        Left message on patient's voicemail this evening with plan. Encouraged to call if any questions.  Lorene Quiroga RN      "

## 2022-06-07 NOTE — PROGRESS NOTES
Order(s) created erroneously. Erroneous order ID: [id number]   Order canceled by: ENZO FOSS   Order cancel date/time: 06/07/2022 7:21 AM

## 2022-07-07 DIAGNOSIS — Z76.0 ENCOUNTER FOR MEDICATION REFILL: ICD-10-CM

## 2022-07-07 DIAGNOSIS — I10 HYPERTENSION GOAL BP (BLOOD PRESSURE) < 140/80: ICD-10-CM

## 2022-07-07 RX ORDER — ATORVASTATIN CALCIUM 20 MG/1
TABLET, FILM COATED ORAL
Qty: 90 TABLET | Refills: 3 | Status: SHIPPED | OUTPATIENT
Start: 2022-07-07 | End: 2023-06-29

## 2022-07-07 RX ORDER — AMLODIPINE BESYLATE 10 MG/1
TABLET ORAL
Qty: 90 TABLET | Refills: 3 | Status: SHIPPED | OUTPATIENT
Start: 2022-07-07 | End: 2023-06-29

## 2022-07-07 NOTE — TELEPHONE ENCOUNTER
Amlodipine, Atorvastatin. LOV 5/26/22. Due for fasting Lipid.     BP Readings from Last 3 Encounters:   05/26/22 124/61   05/05/22 130/51   01/19/22 118/72     Cholesterol   Date Value Ref Range Status   12/11/2020 179 100 - 199 mg/dL Final   07/10/2019 198 100 - 199 mg/dL Final     HDL Cholesterol   Date Value Ref Range Status   12/11/2020 72 >39 mg/dL Final   07/10/2019 84 >39 mg/dL Final     LDL Cholesterol Calculated   Date Value Ref Range Status   12/11/2020 94 0 - 99 mg/dL Final   07/10/2019 104 (H) 0 - 99 mg/dL Final     Triglycerides   Date Value Ref Range Status   12/11/2020 70 0 - 149 mg/dL Final   07/10/2019 51 0 - 149 mg/dL Final     Cholesterol/HDL Ratio   Date Value Ref Range Status   04/27/2013 3.0  Final   02/18/2011 3.7  Final

## 2022-07-29 ENCOUNTER — TELEPHONE (OUTPATIENT)
Dept: FAMILY MEDICINE | Facility: CLINIC | Age: 72
End: 2022-07-29

## 2022-07-29 DIAGNOSIS — M35.3 PMR (POLYMYALGIA RHEUMATICA) (H): Primary | ICD-10-CM

## 2022-07-29 RX ORDER — PREDNISONE 5 MG/1
5 TABLET ORAL DAILY
Qty: 30 TABLET | Refills: 0 | Status: SHIPPED | OUTPATIENT
Start: 2022-08-01 | End: 2022-08-30

## 2022-07-29 NOTE — TELEPHONE ENCOUNTER
Patient calls reporting has 3 days left on her 63 day prednisone taper prescribed by Dr. Pike on 22 for PMR flare.   Took 15mg QD x 21 days then 12.5mg QD x 21 days then 10mg QD x 21 days.   Asks what next step is, decrease dose again or stop?   Plan: per Dr. Pike, will have her decrease to 5mg QD and follow up next week for visit with Dr. Pike for labs and medication plan. Patient agrees and scheduled follow up with Dr. Pike 22 at 9am. Rx sent to day for prednisone 5mg #30 si po QD - further tapering on this will be discussed at  visit.   Lorene Quiroga RN

## 2022-08-02 ENCOUNTER — OFFICE VISIT (OUTPATIENT)
Dept: FAMILY MEDICINE | Facility: CLINIC | Age: 72
End: 2022-08-02

## 2022-08-02 VITALS
RESPIRATION RATE: 16 BRPM | OXYGEN SATURATION: 97 % | HEART RATE: 72 BPM | BODY MASS INDEX: 23.59 KG/M2 | HEIGHT: 66 IN | SYSTOLIC BLOOD PRESSURE: 118 MMHG | DIASTOLIC BLOOD PRESSURE: 71 MMHG | WEIGHT: 146.78 LBS

## 2022-08-02 DIAGNOSIS — M35.3 PMR (POLYMYALGIA RHEUMATICA) (H): Primary | ICD-10-CM

## 2022-08-02 LAB — ERYTHROCYTE [SEDIMENTATION RATE] IN BLOOD: 36 MM/HR (ref 0–20)

## 2022-08-02 PROCEDURE — 85651 RBC SED RATE NONAUTOMATED: CPT | Performed by: FAMILY MEDICINE

## 2022-08-02 PROCEDURE — 99213 OFFICE O/P EST LOW 20 MIN: CPT | Performed by: FAMILY MEDICINE

## 2022-08-02 PROCEDURE — 36415 COLL VENOUS BLD VENIPUNCTURE: CPT | Performed by: FAMILY MEDICINE

## 2022-08-02 NOTE — PROGRESS NOTES
"On  5mg daily of Prednisone for PMR as of yesterday.  Had been on 15 mg for 3 weeks, then 10 mg for 3 weeks.  Went felt bad had very achy shoulders, neck and hips.  Riding her bike and gardening.      Problem(s) Oriented visit      ROS:  General and Resp. completed and negative except as noted above     HISTORY:   reports current alcohol use of about 7.0 standard drinks of alcohol per week.   reports that she quit smoking about 11 years ago. Her smoking use included cigarettes. She has never used smokeless tobacco.    Past Medical History:   Diagnosis Date     ACP (advance care planning)      Colon adenoma 2009     Hyperlipidemia LDL goal < 130      Hypertension goal BP (blood pressure) < 140/80      Osteopenia      PMR (polymyalgia rheumatica) (H)      Pure hypercholesterolemia      Past Surgical History:   Procedure Laterality Date     BREAST BIOPSY, RT/LT      x 2 - benign       EXAM:  BP: 118/71   Pulse: 72    Temp: Data Unavailable    Wt Readings from Last 2 Encounters:   08/02/22 66.6 kg (146 lb 12.5 oz)   05/26/22 67.2 kg (148 lb 3.2 oz)       BMI= Body mass index is 23.87 kg/m .    EXAM:  APPEARANCE: = Relaxed and in no distress      Assessment/Plan:  Staci was seen today for recheck.    Diagnoses and all orders for this visit:    PMR (polymyalgia rheumatica) (H)  Will need long term low dose treatment.  -     Sed Rate Westergren ([facility])  -     VENOUS COLLECTION    She will call to schedule Dexa and Colonoscopy        COUNSELING:   reports that she quit smoking about 11 years ago. Her smoking use included cigarettes. She has never used smokeless tobacco.    Estimated body mass index is 23.87 kg/m  as calculated from the following:    Height as of this encounter: 1.67 m (5' 5.75\").    Weight as of this encounter: 66.6 kg (146 lb 12.5 oz).       Appropriate preventive services were discussed with this patient, including applicable screening as appropriate for cardiovascular disease, diabetes, " osteopenia/osteoporosis, and glaucoma.  As appropriate for age/gender, discussed screening for colorectal cancer, prostate cancer, breast cancer, and cervical cancer. Checklist reviewing preventive services available has been given to the patient.    Reviewed patients plan of care and provided an AVS. The Basic Care Plan (routine screening as documented in Health Maintenance) for Staci meets the Care Plan requirement. This Care Plan has been established and reviewed with the  Patient.      The following health maintenance items are reviewed in Epic and correct as of today:  Health Maintenance   Topic Date Due     LUNG CANCER SCREENING  Never done     DTAP/TDAP/TD IMMUNIZATION (2 - Td or Tdap) 10/16/2016     MEDICARE ANNUAL WELLNESS VISIT  02/16/2017     ADVANCE CARE PLANNING  04/01/2018     ZOSTER IMMUNIZATION (2 of 2) 01/15/2020     COLORECTAL CANCER SCREENING  11/06/2020     FALL RISK ASSESSMENT  12/11/2021     INFLUENZA VACCINE (1) 09/01/2022     MAMMO SCREENING  11/04/2022     DEXA  11/20/2022     COVID-19 Vaccine (5 - Booster) 11/26/2022     LIPID  12/11/2025     HEPATITIS C SCREENING  Completed     PHQ-2 (once per calendar year)  Completed     Pneumococcal Vaccine: 65+ Years  Completed     IPV IMMUNIZATION  Aged Out     MENINGITIS IMMUNIZATION  Aged Out     HEPATITIS B IMMUNIZATION  Aged Out       Isael Pike  [facility]  For any issues my office # is [phone]

## 2022-08-30 DIAGNOSIS — M35.3 PMR (POLYMYALGIA RHEUMATICA) (H): ICD-10-CM

## 2022-08-31 RX ORDER — PREDNISONE 5 MG/1
5 TABLET ORAL DAILY
Qty: 30 TABLET | Refills: 0 | Status: SHIPPED | OUTPATIENT
Start: 2022-08-31 | End: 2022-10-14

## 2022-09-19 ENCOUNTER — TELEPHONE (OUTPATIENT)
Dept: FAMILY MEDICINE | Facility: CLINIC | Age: 72
End: 2022-09-19

## 2022-09-19 DIAGNOSIS — M35.3 PMR (POLYMYALGIA RHEUMATICA) (H): Primary | ICD-10-CM

## 2022-09-19 NOTE — TELEPHONE ENCOUNTER
Patient calls regarding PMR and prednisone. Taking pred 5mg QD currently since 8/2/22 appt with Dr. Pike. Will run out soon and asking if should decrease dose or stay same.   States at 8/2/22 visit Dr. Pike had advised if ESR drawn that day came back in normal range, should decrease prednisone to 2.5mg QD but result was same as May ESR so stayed on 5mg QD. Feeling well as has been since shortly after started prednisone 5/31/22.     Component Sed Rate C-Reactive Protein   Latest Ref Rng & Units 0 - 20 mm/hr 0 - 10 mg/L   4/26/2021 15    1/19/2022 38 (A)    5/5/2022 14 3   5/26/2022 36 (A) 17 (H)   8/2/2022 36 (A)        Plan: per Dr. Pike, patient to RTC for lab appt to get ESR and prednisone dosing pending this result. Patient agrees and ESR scheduled for tomorrow.   Lorene Quiroga RN

## 2022-09-20 ENCOUNTER — TELEPHONE (OUTPATIENT)
Dept: FAMILY MEDICINE | Facility: CLINIC | Age: 72
End: 2022-09-20

## 2022-09-20 DIAGNOSIS — M35.3 PMR (POLYMYALGIA RHEUMATICA) (H): ICD-10-CM

## 2022-09-20 DIAGNOSIS — M35.3 PMR (POLYMYALGIA RHEUMATICA) (H): Primary | ICD-10-CM

## 2022-09-20 LAB — ERYTHROCYTE [SEDIMENTATION RATE] IN BLOOD: 10 MM/HR (ref 0–20)

## 2022-09-20 PROCEDURE — 85651 RBC SED RATE NONAUTOMATED: CPT | Performed by: FAMILY MEDICINE

## 2022-09-20 PROCEDURE — 36415 COLL VENOUS BLD VENIPUNCTURE: CPT | Performed by: FAMILY MEDICINE

## 2022-09-20 NOTE — TELEPHONE ENCOUNTER
Per Dr. Pike, called patient with normal ESR of 10 today. Advised may decrease prednisone to 2.5mg QD x 2 weeks and if feeling well then decrease to 2.5mg every other day for 2 weeks and discontinue medication.   Call or RTC if PMR symptoms recur or any questions.   Patient agrees to plan. Rx sent to pharmacy.

## 2022-09-21 RX ORDER — PREDNISONE 5 MG/1
TABLET ORAL
Qty: 11 TABLET | Refills: 0 | Status: SHIPPED | OUTPATIENT
Start: 2022-09-21 | End: 2022-10-14

## 2022-10-11 ENCOUNTER — HOSPITAL ENCOUNTER (OUTPATIENT)
Dept: BONE DENSITY | Facility: CLINIC | Age: 72
Discharge: HOME | End: 2022-10-11
Attending: NURSE PRACTITIONER | Admitting: NURSE PRACTITIONER
Payer: COMMERCIAL

## 2022-10-11 DIAGNOSIS — M81.0 AGE-RELATED OSTEOPOROSIS WITHOUT CURRENT PATHOLOGICAL FRACTURE: ICD-10-CM

## 2022-10-11 PROCEDURE — 77080 DXA BONE DENSITY AXIAL: CPT

## 2022-10-14 ENCOUNTER — OFFICE VISIT (OUTPATIENT)
Dept: FAMILY MEDICINE | Facility: CLINIC | Age: 72
End: 2022-10-14

## 2022-10-14 VITALS
HEART RATE: 78 BPM | OXYGEN SATURATION: 99 % | SYSTOLIC BLOOD PRESSURE: 128 MMHG | BODY MASS INDEX: 24.56 KG/M2 | WEIGHT: 151 LBS | DIASTOLIC BLOOD PRESSURE: 70 MMHG | RESPIRATION RATE: 16 BRPM

## 2022-10-14 DIAGNOSIS — Z23 NEEDS FLU SHOT: ICD-10-CM

## 2022-10-14 DIAGNOSIS — M35.3 PMR (POLYMYALGIA RHEUMATICA) (H): Primary | ICD-10-CM

## 2022-10-14 PROCEDURE — G0008 ADMIN INFLUENZA VIRUS VAC: HCPCS | Mod: 59 | Performed by: NURSE PRACTITIONER

## 2022-10-14 PROCEDURE — 90694 VACC AIIV4 NO PRSRV 0.5ML IM: CPT | Performed by: NURSE PRACTITIONER

## 2022-10-14 PROCEDURE — 99213 OFFICE O/P EST LOW 20 MIN: CPT | Mod: 25 | Performed by: NURSE PRACTITIONER

## 2022-10-14 RX ORDER — PREDNISONE 5 MG/1
5 TABLET ORAL DAILY
Qty: 30 TABLET | Refills: 0 | Status: SHIPPED | OUTPATIENT
Start: 2022-10-14 | End: 2023-01-10

## 2022-10-14 NOTE — PATIENT INSTRUCTIONS
Go back to Prednisone 5 mg daily    Can also take Tylenol 1000 mg up to 3 times daily    Call next week if no improvement, sooner if worsening.     Otherwise, I will call you to follow-up in 2 weeks.

## 2022-10-14 NOTE — PROGRESS NOTES
Problem(s) Oriented visit        SUBJECTIVE:                                                    Staci Lopez is a 72 year old female who presents to clinic today for the following health issues :    Has been tapering down on Prednisone taken for polymyalgia rheumatica. Had this several years ago and was in remission after 3 years. Then after having Covid infection fall 2020 symptoms returned. Took Prednisone 10 mg daily for a long time then down to 5 mg daily. 2.5 mg daily started a couple weeks ago and started to notice some aching. Now today is her first day off from the 2.5 mg every other day and feeling very achy. Arthritis especially in left thumb is bad. Last sed rate last month was 10.     Problem list, Medication list, Allergies, and Medical/Social/Surgical histories reviewed in [facility] and updated as appropriate.   Additional history: as documented    ROS:  3 point ROS completed and negative except noted above, including Gen, MS, Neuro    OBJECTIVE:                                                    /70   Pulse 78   Resp 16   Wt 68.5 kg (151 lb)   SpO2 99%   BMI 24.56 kg/m    Body mass index is 24.56 kg/m .   GENERAL: healthy, alert and no distress  MS: extremities normal- no gross deformities noted  SKIN: no suspicious lesions or rashes  PSYCH: normal affect & mood     ASSESSMENT/PLAN:                                                      Staci was seen today for recheck medication.    Diagnoses and all orders for this visit:    PMR (polymyalgia rheumatica) (H)  -     predniSONE (DELTASONE) 5 MG tablet; Take 1 tablet (5 mg) by mouth daily    Needs flu shot  -     FLUAD QUADRIVALENT 65+ ([facility] ONLY)        See Patient Instructions  Patient Instructions   Go back to Prednisone 5 mg daily    Can also take Tylenol 1000 mg up to 3 times daily    Call next week if no improvement, sooner if worsening.     Otherwise, I will call you to follow-up in 2 weeks.       JESSICA Cullen  CNP  [facility]  [phone]    For any issues my office # is [phone]

## 2022-10-19 ENCOUNTER — TELEPHONE (OUTPATIENT)
Dept: FAMILY MEDICINE | Facility: CLINIC | Age: 72
End: 2022-10-19

## 2022-10-19 DIAGNOSIS — M81.0 OSTEOPOROSIS: Primary | ICD-10-CM

## 2022-10-19 RX ORDER — ALENDRONATE SODIUM 70 MG/1
70 TABLET ORAL
Qty: 12 TABLET | Refills: 3 | Status: SHIPPED | OUTPATIENT
Start: 2022-10-19

## 2022-10-19 NOTE — TELEPHONE ENCOUNTER
Called and spoke with patient regarding bone density results per Dr Pike. Patient agrees and will start Fosamax, prescription sent to pharmacy. Patient advised to call clinic with questions or concerns. Violeta Dunn

## 2022-10-19 NOTE — TELEPHONE ENCOUNTER
----- Message from Isael Pike MD sent at 10/18/2022  3:48 PM CDT -----  I don't think she has been on aldenronate before?  We should start 70 per week    KN

## 2022-11-02 ENCOUNTER — MYC MEDICAL ADVICE (OUTPATIENT)
Dept: FAMILY MEDICINE | Facility: CLINIC | Age: 72
End: 2022-11-02

## 2022-11-02 DIAGNOSIS — M35.3 PMR (POLYMYALGIA RHEUMATICA) (H): ICD-10-CM

## 2022-11-03 RX ORDER — PREDNISONE 5 MG/1
5 TABLET ORAL DAILY
Qty: 30 TABLET | Refills: 0 | Status: CANCELLED | OUTPATIENT
Start: 2022-11-03

## 2022-11-03 NOTE — TELEPHONE ENCOUNTER
"Spoke with pt. She  Has not noticed a difference since increasing Prednisone dose. Still \"achy\". Will run out of pills sone.   "

## 2022-11-04 NOTE — TELEPHONE ENCOUNTER
Recommend patient have follow-up in clinic and/or referral to Rheumatology. Concern that polymyalgia rheumatica may not be correct diagnosis due to symptoms and lack of improvement, worsening with taper.    JESSICA Cullen CNP on 11/4/2022 at 1:11 PM

## 2022-11-08 ENCOUNTER — OFFICE VISIT (OUTPATIENT)
Dept: FAMILY MEDICINE | Facility: CLINIC | Age: 72
End: 2022-11-08

## 2022-11-08 VITALS
DIASTOLIC BLOOD PRESSURE: 74 MMHG | SYSTOLIC BLOOD PRESSURE: 145 MMHG | OXYGEN SATURATION: 98 % | BODY MASS INDEX: 24.01 KG/M2 | HEART RATE: 72 BPM | HEIGHT: 66 IN | WEIGHT: 149.4 LBS | RESPIRATION RATE: 16 BRPM

## 2022-11-08 DIAGNOSIS — M35.3 PMR (POLYMYALGIA RHEUMATICA) (H): ICD-10-CM

## 2022-11-08 DIAGNOSIS — Z86.0100 HISTORY OF COLONIC POLYPS: Primary | ICD-10-CM

## 2022-11-08 LAB — ERYTHROCYTE [SEDIMENTATION RATE] IN BLOOD: 38 MM/HR (ref 0–20)

## 2022-11-08 PROCEDURE — 85651 RBC SED RATE NONAUTOMATED: CPT | Performed by: FAMILY MEDICINE

## 2022-11-08 PROCEDURE — 36415 COLL VENOUS BLD VENIPUNCTURE: CPT | Performed by: FAMILY MEDICINE

## 2022-11-08 PROCEDURE — 99214 OFFICE O/P EST MOD 30 MIN: CPT | Performed by: FAMILY MEDICINE

## 2022-11-08 RX ORDER — PREDNISONE 5 MG/1
5 TABLET ORAL DAILY
Qty: 30 TABLET | Refills: 0 | Status: CANCELLED | OUTPATIENT
Start: 2022-11-08

## 2022-11-08 RX ORDER — PREDNISONE 5 MG/1
TABLET ORAL
Qty: 158 TABLET | Refills: 0 | Status: SHIPPED | OUTPATIENT
Start: 2022-11-08 | End: 2023-01-10

## 2022-11-08 NOTE — RESULT ENCOUNTER NOTE
Dear Staci,     I am writing to report that your included test results are high as expected.    We will continue the plan as ordered today    Isael Pike MD   Patient is a 49y old  Male who presents with a chief complaint of Multiple CVA (25 Oct 2022 18:35)      Patient seen and examined at bedside.  No overnight events  No complaints this morning    1 - 3 elements + 1 ROS    ALLERGIES:  No Known Allergies    MEDICATIONS  (STANDING):  amitriptyline 10 milliGRAM(s) Oral at bedtime  amLODIPine   Tablet 10 milliGRAM(s) Oral daily  AQUAPHOR (petrolatum Ointment) 1 Application(s) Topical two times a day  atorvastatin 40 milliGRAM(s) Oral at bedtime  budesonide  80 MICROgram(s)/formoterol 4.5 MICROgram(s) Inhaler 2 Puff(s) Inhalation two times a day  carvedilol 25 milliGRAM(s) Oral every 12 hours  dextrose 5%. 1000 milliLiter(s) (50 mL/Hr) IV Continuous <Continuous>  dextrose 5%. 1000 milliLiter(s) (100 mL/Hr) IV Continuous <Continuous>  dextrose 50% Injectable 25 Gram(s) IV Push once  dextrose 50% Injectable 12.5 Gram(s) IV Push once  dextrose 50% Injectable 25 Gram(s) IV Push once  fenofibrate Tablet 145 milliGRAM(s) Oral daily  glucagon  Injectable 1 milliGRAM(s) IntraMuscular once  heparin   Injectable 5000 Unit(s) SubCutaneous every 8 hours  insulin glargine Injectable (LANTUS) 20 Unit(s) SubCutaneous at bedtime  insulin lispro (ADMELOG) corrective regimen sliding scale   SubCutaneous three times a day before meals  insulin lispro (ADMELOG) corrective regimen sliding scale   SubCutaneous at bedtime  insulin lispro Injectable (ADMELOG) 2 Unit(s) SubCutaneous before breakfast  insulin lispro Injectable (ADMELOG) 2 Unit(s) SubCutaneous before lunch  insulin lispro Injectable (ADMELOG) 2 Unit(s) SubCutaneous before dinner  lisinopril 2.5 milliGRAM(s) Oral daily  multivitamin 1 Tablet(s) Oral daily  pantoprazole    Tablet 40 milliGRAM(s) Oral before breakfast  senna 2 Tablet(s) Oral at bedtime    MEDICATIONS  (PRN):  acetaminophen     Tablet .. 650 milliGRAM(s) Oral every 6 hours PRN Temp greater or equal to 38C (100.4F), Mild Pain (1 - 3)  ALBUTerol    90 MICROgram(s) HFA Inhaler 2 Puff(s) Inhalation every 6 hours PRN Shortness of Breath and/or Wheezing  Biotene Dry Mouth Oral Rinse 15 milliLiter(s) Swish and Spit three times a day PRN Mouth Care  dextrose Oral Gel 15 Gram(s) Oral once PRN Blood Glucose LESS THAN 70 milliGRAM(s)/deciliter  simethicone 80 milliGRAM(s) Chew daily PRN Gas  sodium chloride 0.65% Nasal 1 Spray(s) Both Nostrils every 1 hour PRN Nasal Congestion    Vital Signs Last 24 Hrs  T(F): 98.1 (26 Oct 2022 08:39), Max: 98.1 (26 Oct 2022 08:39)  HR: 69 (26 Oct 2022 08:39) (69 - 81)  BP: 117/73 (26 Oct 2022 08:39) (117/73 - 132/70)  RR: 16 (26 Oct 2022 08:39) (15 - 18)  SpO2: 100% (26 Oct 2022 08:39) (98% - 100%)  I&O's Summary    BMI (kg/m2): 28.2 (10-25-22 @ 07:07)    PHYSICAL EXAM:  GENERAL: NAD  HENT:  Atraumatic, Normocephalic; No tonsillar erythema, exudates, or enlargement; Moist mucous membranes;   EYES: EOMI, PERRLA, conjunctiva and sclera clear, no lid-lag  NECK: Supple, No JVD, Normal thyroid  CHEST/LUNG: Clear to percussion bilaterally; No rales, rhonchi, wheezing, or rubs; normal respiratory effort, no intercostal retractions  HEART: Regular rate and rhythm; No murmurs, rubs, or gallops; No pitting edema  ABDOMEN: Soft, Nontender, Nondistended; Bowel sounds present; No HSM  MUSCULOSKELETAL/EXTREMITIES:  2+ Peripheral Pulses, No clubbing or digital cyanosis  PSYCH: Appropriate affect, Alert & Awake; Good judgement    LABS:                        10.4   7.92  )-----------( 193      ( 26 Oct 2022 06:12 )             31.5       10-26    140  |  106  |  63  ----------------------------<  155  4.3   |  29  |  3.31    Ca    9.0      26 Oct 2022 06:12    TPro  6.7  /  Alb  3.1  /  TBili  0.2  /  DBili  x   /  AST  25  /  ALT  25  /  AlkPhos  87  10-26     POCT Blood Glucose.: 160 mg/dL (26 Oct 2022 07:25)  POCT Blood Glucose.: 189 mg/dL (25 Oct 2022 21:54)  POCT Blood Glucose.: 287 mg/dL (25 Oct 2022 17:11)    COVID-19 PCR: NotDetec (10-24-22 @ 14:05)  COVID-19 PCR: NotDetec (10-23-22 @ 06:20)  COVID-19 PCR: NotDetec (10-20-22 @ 14:41)  COVID-19 PCR: NotDetec (10-17-22 @ 07:40)  COVID-19 PCR: NotDetec (10-13-22 @ 15:08)    Care Discussed with Rehab Attending and Other Providers   Patient is a 49y old  Male who presents with a chief complaint of Multiple CVA (25 Oct 2022 18:35)      Patient seen and examined at bedside.  No overnight events  No complaints this morning    ALLERGIES:  No Known Allergies    MEDICATIONS  (STANDING):  amitriptyline 10 milliGRAM(s) Oral at bedtime  amLODIPine   Tablet 10 milliGRAM(s) Oral daily  AQUAPHOR (petrolatum Ointment) 1 Application(s) Topical two times a day  atorvastatin 40 milliGRAM(s) Oral at bedtime  budesonide  80 MICROgram(s)/formoterol 4.5 MICROgram(s) Inhaler 2 Puff(s) Inhalation two times a day  carvedilol 25 milliGRAM(s) Oral every 12 hours  dextrose 5%. 1000 milliLiter(s) (50 mL/Hr) IV Continuous <Continuous>  dextrose 5%. 1000 milliLiter(s) (100 mL/Hr) IV Continuous <Continuous>  dextrose 50% Injectable 25 Gram(s) IV Push once  dextrose 50% Injectable 12.5 Gram(s) IV Push once  dextrose 50% Injectable 25 Gram(s) IV Push once  fenofibrate Tablet 145 milliGRAM(s) Oral daily  glucagon  Injectable 1 milliGRAM(s) IntraMuscular once  heparin   Injectable 5000 Unit(s) SubCutaneous every 8 hours  insulin glargine Injectable (LANTUS) 20 Unit(s) SubCutaneous at bedtime  insulin lispro (ADMELOG) corrective regimen sliding scale   SubCutaneous three times a day before meals  insulin lispro (ADMELOG) corrective regimen sliding scale   SubCutaneous at bedtime  insulin lispro Injectable (ADMELOG) 2 Unit(s) SubCutaneous before breakfast  insulin lispro Injectable (ADMELOG) 2 Unit(s) SubCutaneous before lunch  insulin lispro Injectable (ADMELOG) 2 Unit(s) SubCutaneous before dinner  lisinopril 2.5 milliGRAM(s) Oral daily  multivitamin 1 Tablet(s) Oral daily  pantoprazole    Tablet 40 milliGRAM(s) Oral before breakfast  senna 2 Tablet(s) Oral at bedtime    MEDICATIONS  (PRN):  acetaminophen     Tablet .. 650 milliGRAM(s) Oral every 6 hours PRN Temp greater or equal to 38C (100.4F), Mild Pain (1 - 3)  ALBUTerol    90 MICROgram(s) HFA Inhaler 2 Puff(s) Inhalation every 6 hours PRN Shortness of Breath and/or Wheezing  Biotene Dry Mouth Oral Rinse 15 milliLiter(s) Swish and Spit three times a day PRN Mouth Care  dextrose Oral Gel 15 Gram(s) Oral once PRN Blood Glucose LESS THAN 70 milliGRAM(s)/deciliter  simethicone 80 milliGRAM(s) Chew daily PRN Gas  sodium chloride 0.65% Nasal 1 Spray(s) Both Nostrils every 1 hour PRN Nasal Congestion    Vital Signs Last 24 Hrs  T(F): 98.1 (26 Oct 2022 08:39), Max: 98.1 (26 Oct 2022 08:39)  HR: 69 (26 Oct 2022 08:39) (69 - 81)  BP: 117/73 (26 Oct 2022 08:39) (117/73 - 132/70)  RR: 16 (26 Oct 2022 08:39) (15 - 18)  SpO2: 100% (26 Oct 2022 08:39) (98% - 100%)  I&O's Summary    BMI (kg/m2): 28.2 (10-25-22 @ 07:07)    PHYSICAL EXAM:  GENERAL: NAD  HENT:  Atraumatic, Normocephalic; No tonsillar erythema, exudates, or enlargement; Moist mucous membranes;   EYES: EOMI, PERRLA, conjunctiva and sclera clear, no lid-lag  NECK: Supple, No JVD, Normal thyroid  CHEST/LUNG: Clear to percussion bilaterally; No rales, rhonchi, wheezing, or rubs; normal respiratory effort, no intercostal retractions  HEART: Regular rate and rhythm; No murmurs, rubs, or gallops; No pitting edema  ABDOMEN: Soft, Nontender, Nondistended; Bowel sounds present; No HSM  MUSCULOSKELETAL/EXTREMITIES:  2+ Peripheral Pulses, No clubbing or digital cyanosis  PSYCH: Appropriate affect, Alert & Awake; Good judgement    LABS:                        10.4   7.92  )-----------( 193      ( 26 Oct 2022 06:12 )             31.5       10-26    140  |  106  |  63  ----------------------------<  155  4.3   |  29  |  3.31    Ca    9.0      26 Oct 2022 06:12    TPro  6.7  /  Alb  3.1  /  TBili  0.2  /  DBili  x   /  AST  25  /  ALT  25  /  AlkPhos  87  10-26     POCT Blood Glucose.: 160 mg/dL (26 Oct 2022 07:25)  POCT Blood Glucose.: 189 mg/dL (25 Oct 2022 21:54)  POCT Blood Glucose.: 287 mg/dL (25 Oct 2022 17:11)    COVID-19 PCR: NotDetec (10-24-22 @ 14:05)  COVID-19 PCR: NotDetec (10-23-22 @ 06:20)  COVID-19 PCR: NotDetec (10-20-22 @ 14:41)  COVID-19 PCR: NotDetec (10-17-22 @ 07:40)  COVID-19 PCR: NotDetec (10-13-22 @ 15:08)    Care Discussed with Rehab Attending and Other Providers

## 2022-11-08 NOTE — PROGRESS NOTES
Was weaning off of prednisone PMR was coming back  Lots of pain in the neck back upper arms.  Boosted the prednisone to 5 mg and it is not helping.    Problem(s) Oriented visit      ROS:  General and CV completed and negative except as noted above     HISTORY:   reports current alcohol use of about 7.0 standard drinks per week.   reports that she quit smoking about 12 years ago. Her smoking use included cigarettes. She has never used smokeless tobacco.    Past Medical History:   Diagnosis Date     ACP (advance care planning)      Colon adenoma 2009     Hyperlipidemia LDL goal < 130      Hypertension goal BP (blood pressure) < 140/80      Osteopenia      PMR (polymyalgia rheumatica) (H)      Pure hypercholesterolemia      Past Surgical History:   Procedure Laterality Date     BREAST BIOPSY, RT/LT      x 2 - benign       EXAM:  BP: 145/74   Pulse: 72    Temp: Data Unavailable    Wt Readings from Last 2 Encounters:   11/08/22 67.8 kg (149 lb 6.4 oz)   10/14/22 68.5 kg (151 lb)       BMI= Body mass index is 24.3 kg/m .    EXAM:  APPEARANCE: = Relaxed and in no distress  Ears/Nose = External structures and Nares have usual shape and form  Breath Sounds = Good air movement with no rales or rhonchi in any lung fields      Assessment/Plan:  Staci was seen today for recheck medication and recheck.    Diagnoses and all orders for this visit:    History of colonic polyps  -     Adult GI  Referral - Procedure Only; Future    PMR (polymyalgia rheumatica) (H)  -     predniSONE (DELTASONE) 5 MG tablet; Take 3 tablets (15 mg) by mouth daily for 21 days, THEN 2.5 tablets (12.5 mg) daily for 21 days, THEN 2 tablets (10 mg) daily for 21 days.  -     Sed Mely Nelson (KEESHA)    Discussed polymyalgia rheumatica in detail including diagnostic criteria, treatment options and the potential for a prolonged course of steroids over many weeks or even months.  Discussed the risk of temporal arteritis and vision changes.  They  "promised to report any vision changes immediately.    Discussed the risks and benefits of the steroids, (including, but not limited to GI ulcers, edema, steroid facies, steroid myopathy, insomnia, weight gain, osteoporosis, elevated glucoses, etc)      COUNSELING:   reports that she quit smoking about 12 years ago. Her smoking use included cigarettes. She has never used smokeless tobacco.    Estimated body mass index is 24.3 kg/m  as calculated from the following:    Height as of this encounter: 1.67 m (5' 5.75\").    Weight as of this encounter: 67.8 kg (149 lb 6.4 oz).       Appropriate preventive services were discussed with this patient, including applicable screening as appropriate for cardiovascular disease, diabetes, osteopenia/osteoporosis, and glaucoma.  As appropriate for age/gender, discussed screening for colorectal cancer, prostate cancer, breast cancer, and cervical cancer. Checklist reviewing preventive services available has been given to the patient.    Reviewed patients plan of care and provided an AVS. The Basic Care Plan (routine screening as documented in Health Maintenance) for Staci meets the Care Plan requirement. This Care Plan has been established and reviewed with the  Patient.      The following health maintenance items are reviewed in Epic and correct as of today:  Health Maintenance   Topic Date Due     DTAP/TDAP/TD IMMUNIZATION (2 - Td or Tdap) 10/16/2016     MEDICARE ANNUAL WELLNESS VISIT  02/16/2017     ADVANCE CARE PLANNING  04/01/2018     ZOSTER IMMUNIZATION (2 of 2) 01/15/2020     COLORECTAL CANCER SCREENING  11/06/2020     FALL RISK ASSESSMENT  12/11/2021     COVID-19 Vaccine (5 - Booster) 09/20/2022     MAMMO SCREENING  11/04/2022     DEXA  10/11/2025     LIPID  12/11/2025     HEPATITIS C SCREENING  Completed     PHQ-2 (once per calendar year)  Completed     INFLUENZA VACCINE  Completed     Pneumococcal Vaccine: 65+ Years  Completed     IPV IMMUNIZATION  Aged Out     " MENINGITIS IMMUNIZATION  Aged Out     HEPATITIS B IMMUNIZATION  Discontinued     LUNG CANCER SCREENING  Discontinued       Isael Pike  [facility]  For any issues my office # is [phone]

## 2022-11-29 NOTE — PATIENT INSTRUCTIONS
Patient Education   Personalized Prevention Plan  You are due for the preventive services outlined below.  Your care team is available to assist you in scheduling these services.  If you have already completed any of these items, please share that information with your care team to update in your medical record.  Health Maintenance Due   Topic Date Due     Diptheria Tetanus Pertussis (DTAP/TDAP/TD) Vaccine (2 - Td or Tdap) 10/16/2016     Discuss Advance Care Planning  04/01/2018     Colorectal Cancer Screening  11/06/2020     FALL RISK ASSESSMENT  12/11/2021     COVID-19 Vaccine (5 - Booster) 09/20/2022     Mammogram  11/04/2022          Understanding Atrial Fibrillation     An arrhythmia is any problem with the speed or pattern of the heartbeat. Atrial fibrillation (AFib) is the most common type of arrhythmia. It causes fast, chaotic electrical signals in the atria. This makes it hard for the heart to work as it should. It also affects how much blood your heart can pump out to the body.  AFib may occur once in a while and go away on its own. Or it may continue for longer periods and need treatment.  AFib can lead to serious problems, such as stroke. Your healthcare provider will need to monitor and manage it.    What happens during atrial fibrillation?   The heart has an electrical system that sends signals to control the heartbeat. As signals move through the heart, they tell the heart s upper chambers (atria) and lower chambers (ventricles) when to squeeze (contract) and relax. This lets blood move through the heart and out to the body and lungs.  With AFib, the atria receive abnormal signals. This causes them to contract in a fast and irregular way, and out of sync with the ventricles. When this happens, the atria also have a harder time moving blood into the ventricles. Blood may then pool in the atria. This increases the risk for blood clots and stroke. The ventricles also may contract too quickly and  irregularly. As a result, they may not pump blood to the body and lungs as well as they should. This can weaken the heart muscle over time and cause heart failure.  What causes atrial fibrillation?  AFib is more common in older adults. It has many possible causes:    Coronary artery disease    Heart valve disease    Heart attack    Heart surgery    High blood pressure    Thyroid disease    Diabetes    Lung disease    Sleep apnea    Heavy alcohol use  In some cases of AFib, doctors don't know the cause.  What are the symptoms of atrial fibrillation?  AFib may not cause symptoms. If symptoms do occur, they may include:    A fast, pounding, irregular heartbeat    Shortness of breath    Tiredness    Dizziness or fainting    Chest pain  How is atrial fibrillation treated?  Treatments for AFib can include any of the options below.    Medicines. You may be prescribed:  ? Heart rate medicines to help slow down the heartbeat  ? Heart rhythm medicines to help the heart beat more regularly  ? Blood thinners or anti-clotting medicines to help reduce the risk for blood clots and stroke.    Left atrial appendage closure. Your healthcare provider may advise this device to prevent stroke. You may need if you are at high risk for stroke but have problems taking blood-thinner (anticoagulant) medicines. The device is placed in the part of the heart where most clots form. This area is called the left atrial appendage (JOHN). It's a pouch-like structure in the muscle wall of the left atrium. The device closes off the JOHN to prevent clots moving from the heart to the brain and causing a stroke.    Electrical cardioversion. Your healthcare provider uses special pads or paddles to send one or more brief electrical shocks to the heart. This can help reset the heartbeat to normal.    Ablation. Long, thin tubes (catheters) are threaded through a blood vessel to the heart. There, the catheters send out hot or cold energy to the areas causing  the abnormal signals. This energy destroys the problem tissue or cells. This improves the chances that your heart will stay in normal rhythm without using medicines. If your heart rate and rhythm can t be controlled, you may need ablation and a pacemaker. These will help control the heart rate and regularity of the heartbeat.    Surgery. During surgery, your healthcare provider may use different methods to create scar tissue in the areas of the heart causing the abnormal signals. The scar tissue disrupts the abnormal signals and may stop AFib from occurring.    Hybrid surgical-catheter ablation for AFib. This treatment is used for people with AFib that continues or is hard to treat.. It combines surgery with a catheter ablation. During the surgery, the surgeon makes small cuts (incisions) between the ribs in the chest or in the abdomen near the sternum. The surgeon puts a scope through the incisions to get to the backside of the heart. The catheter portion of the procedure is done by putting a catheter into a vein in the groin. The catheter is guided to the inside of the heart. Using the catheter, radiofrequency ablation is done to destroy the tissue inside the heart that is causing the AFib. Using both of these approaches may work better to block the abnormal electrical signals and be a more permanent treatment for persistent AFib.  What are possible complications of atrial fibrillation?  Complications can include:    Blood clots    Stroke    Heart failure. This problem occurs when the heart muscle weakens so much that it can no longer pump blood well.  When should I call my healthcare provider?  Call your healthcare provider right away if you have any of these:    Symptoms that don t get better with treatment, or get worse    New symptoms  Janeth last reviewed this educational content on 4/1/2019 2000-2021 [employer]. All rights reserved. This information is not intended as a substitute for  professional medical care. Always follow your healthcare professional's instructions.          Living with Atrial Fibrillation: Preventing Stroke  Atrial fibrillation (AFib) is the most common abnormal heart rhythm in the world. The heart has 2 upper chambers called atria and 2 lower chambers called ventricles. AFib causes the atria to quiver (fibrillate) instead of pumping normally. Blood can then pool in the heart instead of moving in and out as usual. This can cause blood clots to form in the heart. A clot can break free, travel to the brain and cause a stroke. A stroke can quickly damage the brain.    Taking medicine to prevent stroke  Your healthcare provider may prescribe a medicine to help prevent blood clots. This type of medicine is called a blood thinner. Blood thinners include:    Antiplatelet medicines, such as aspirin or clopidrogrel    Anticoagulant medicines, such as warfarin, or medicines called direct-acting oral anticoagulants (DOAC). These include dabigatran, rivaroxaban, apixaban, and edoxaban.  Risks of blood thinner medicine  Blood thinners raise your risk of bleeding. If you take certain blood thinners, you may need to take extra steps to stay healthy. Depending on the blood thinner, you may need regular blood tests to check the levels of medicine in your blood. You ll need to be careful not to injure yourself. And you may need to watch your diet for foods that affect blood clotting.  If your blood is too thin, you may have symptoms of excess bleeding, such as:    Unusual bruising    Bleeding from the gums    Blood in the urine or stool    Black stools    Vomiting blood    Nosebleeds    An unusual or severe headache  Taking the right dose  Take the medicine exactly as directed by your healthcare provider. Take it at the same time each day. If you miss a dose, call your provider right away to find out how much to take. Never take a double dose. If you take too much, it can cause too much  bleeding. It can cause bleeding you can see on the outside of your body. It can also cause bleeding on the inside of your body that you may not be aware of.  Getting your blood tested  If you take DOACs, you don't need frequent blood tests. But, you may need monitoring of your kidneys. Your healthcare provider will discuss this with you in detail.   If you take warfarin, you will need to have your blood tested on a regular schedule. This is to make sure you don t have too much or too little of the medicine in your blood. Too much can cause excess bleeding. Too little may not prevent blood clots from harming you.  You may need to visit a hospital or clinic every week to have your blood tested. Or a nurse may come to your home and test your blood. In some cases, you may be able to test your blood at home yourself with a small machine. Talk with your healthcare provider to find out what s best for you. After the blood test, your healthcare provider may tell you to change your dose of medicine.  Watching your diet  Warfarin levels are sensitive to your diet. For example, many foods contain vitamin K. Vitamin K helps your blood clot. You don t need to avoid foods that have vitamin K. But you do need to keep the amount of them you eat steady as possible from day to day. Examples of foods high in vitamin K are asparagus, avocado, broccoli, cabbage, kale, spinach, and some other leafy green vegetables. Oils, such as soybean, canola, and olive, are also high in vitamin K.  Other foods and drinks can affect the way blood thinners work in your body. These include:    Grapefruit and grapefruit juice    Cranberries and cranberry juice    Fish oil supplements    Garlic, brenden, licorice, and turmeric    Herbs used in herbal teas or supplements    Alcohol  If any of these items are part of your regular diet, continue using them as you normally would. Don t make any big changes in your diet without first talking with your  healthcare provider.  You may also need to limit fats in your diet to 2 to 4 tablespoons a day.  Preventing injury  Because blood thinners make you bleed more, you ll need to protect yourself from breaks in the skin. Follow these guidelines:    Don't go barefoot. Always wear shoes.    Don't trim corns or calluses yourself.    Use an electric razor instead of a manual one.    Use a soft-bristled toothbrush and waxed dental floss.  You ll also need to avoid any activities that may cause injury. If you fall or are injured, you could be bleeding inside your body and not know it. Get medical care right away if you fall, hit your head, or have any other kind of injury.  Other safety tips  While on your medicine:    Tell all of your healthcare providers that you take a blood thinner for AFib. This includes all of your doctors, dental care providers, and your pharmacist.    Ask your doctor before taking any new medicines, vitamins, or other supplements. Any of these can cause problems when you take a blood thinner.    Wear a medical alert bracelet or carry an ID card in your wallet if you will be taking blood thinners for months or longer.    Keep all appointments for your blood tests.  Procedures to prevent stroke  Most blood clots that form in the heart occur in a pouch of the left atrium called the appendage. This pouch can often be large and have multiple lobes. Blood often pools and form clots here. Left atrial appendage closure is a nonsurgical procedure in which a plug is placed at the opening of the left atrial appendage. This closes it off from the rest of the heart. Once the plug has sealed, no blood can enter or leave the pouch. This reduces blood clot formation and stroke risk. Ask your doctor if you qualify for this type of procedure.   Other ways to help prevent stroke  Your healthcare provider might give you other advice about how to lower your risk for stroke. Tips include:    Lower your cholesterol with  lifestyle changes or medicine    Don't smoke    Get physical activity    Lose weight if needed    Eat a heart-healthy diet    Don't drink too much alcohol  When to call your healthcare provider  Call your healthcare provider right away if you have any of these:    Unusual or severe headache    Confusion, weakness, or numbness    Loss of vision    Trouble with speech    Bleeding that won t stop    Coughing or vomiting blood    Bright red blood or black tar in the stool    Fall or injury to the head    Symptoms of AFib that are new or getting worse  Janeth last reviewed this educational content on 5/1/2019 2000-2021 [employer]. All rights reserved. This information is not intended as a substitute for professional medical care. Always follow your healthcare professional's instructions.

## 2022-11-29 NOTE — PROGRESS NOTES
"  SUBJECTIVE:   Staci Lopez is a 72 year old female who presents for Preventive Visit.       [facility]    Patient has been advised of split billing requirements and indicates understanding: Yes  Are you in the first 12 months of your Medicare Part B coverage?  No    Physical Health:    In general, how would you rate your overall physical health? good    Outside of work, how many days during the week do you exercise? 2-3 days/week    Outside of work, approximately how many minutes a day do you exercise?15-30 minutes    If you drink alcohol do you typically have >3 drinks per day or >7 drinks per week? No    Do you usually eat at least 4 servings of fruit and vegetables a day, include whole grains & fiber and avoid regularly eating high fat or \"junk\" foods? Yes    Do you have any problems taking medications regularly?  No    Do you have any side effects from medications? none    Needs assistance for the following daily activities: no assistance needed    Which of the following safety concerns are present in your home?  none identified     Hearing impairment: No    In the past 6 months, have you been bothered by leaking of urine? no  HEARING FREQUENCY:   Right Ear:     500 Hz :  pass   1000 Hz:  pass   2000 Hz:  pass   4000 Hz:  pass  Left Ear:     500 Hz :  pass   1000 Hz:  pass   2000 Hz:  pass   4000 Hz:  pass  Angy Alba CMA 12/1/2022  Mental Health:    In general, how would you rate your overall mental or emotional health? excellent  PHQ-2 Score:      Do you feel safe in your environment? Yes    Have you ever done Advance Care Planning? (For example, a Health Directive, POLST, or a discussion with a medical provider or your loved ones about your wishes): Yes, patient states has an Advance Care Planning document and will bring a copy to the clinic.    Additional concerns to address?  No    Fall risk:  Fallen 2 or more times in the past year?: No  Any fall with " injury in the past year?: No    Cognitive Screenin) Repeat 3 items (Leader, Season, Table)    2) Clock draw: NORMAL  3) 3 item recall: Recalls 3 objects  Results: 3 items recalled: COGNITIVE IMPAIRMENT LESS LIKELY    Mini-CogTM Copyright S Margot. Licensed by the author for use in [facility]; reprinted with permission (ramón@[facility]). All rights reserved.      Do you have sleep apnea, excessive snoring or daytime drowsiness?: no        PROBLEMS TO ADD ON...  New onset afib  Recheck PMR    Reviewed and updated as needed this visit by clinical staff    Allergies  Meds  Problems             Reviewed and updated as needed this visit by Provider      Problems            Social History     Tobacco Use     Smoking status: Former     Types: Cigarettes     Quit date: 2010     Years since quittin.0     Smokeless tobacco: Never   Substance Use Topics     Alcohol use: Yes     Alcohol/week: 7.0 standard drinks     Types: 7 Glasses of wine per week                           Current providers sharing in care for this patient include:   Patient Care Team:  Isael Pike MD as PCP - General (Family Medicine)  Isael Pike MD as Assigned PCP    The following health maintenance items are reviewed in Epic and correct as of today:  Health Maintenance   Topic Date Due     DTAP/TDAP/TD IMMUNIZATION (2 - Td or Tdap) 10/16/2016     COLORECTAL CANCER SCREENING  2020     COVID-19 Vaccine (5 - Booster) 2022     MAMMO SCREENING  2022     MEDICARE ANNUAL WELLNESS VISIT  2023     FALL RISK ASSESSMENT  2023     DEXA  10/11/2025     LIPID  2025     ADVANCE CARE PLANNING  2027     HEPATITIS C SCREENING  Completed     PHQ-2 (once per calendar year)  Completed     INFLUENZA VACCINE  Completed     Pneumococcal Vaccine: 65+ Years  Completed     ZOSTER IMMUNIZATION  Completed     IPV IMMUNIZATION  Aged Out     MENINGITIS IMMUNIZATION  Aged Out     LUNG CANCER  "SCREENING  Discontinued     Lab work is in process      ROS:  Constitutional, HEENT, cardiovascular, pulmonary, GI, , musculoskeletal, neuro, skin, endocrine and psych systems are negative, except as otherwise noted.    OBJECTIVE:   /75   Pulse 62   Resp 16   Ht 1.651 m (5' 5\")   Wt 67.7 kg (149 lb 3.2 oz)   SpO2 99%   BMI 24.83 kg/m   Estimated body mass index is 24.83 kg/m  as calculated from the following:    Height as of this encounter: 1.651 m (5' 5\").    Weight as of this encounter: 67.7 kg (149 lb 3.2 oz).  EXAM:   GENERAL APPEARANCE: healthy, alert and no distress  EYES: Eyes grossly normal to inspection, PERRL and conjunctivae and sclerae normal  HENT: ear canals and TM's normal, nose and mouth without ulcers or lesions, oropharynx clear and oral mucous membranes moist  NECK: no adenopathy, no asymmetry, masses, or scars and thyroid normal to palpation  RESP: lungs clear to auscultation - no rales, rhonchi or wheezes  BREAST: normal without masses, tenderness or nipple discharge and no palpable axillary masses or adenopathy  CV: no murmur, click or rub, no peripheral edema, peripheral pulses strong and irregularly irregular rhythm  ABDOMEN: soft, nontender, no hepatosplenomegaly, no masses and bowel sounds normal  MS: no musculoskeletal defects are noted and gait is age appropriate without ataxia  SKIN: no suspicious lesions or rashes  NEURO: Normal strength and tone, sensory exam grossly normal, mentation intact and speech normal  PSYCH: mentation appears normal and affect normal/bright    Diagnostic Test Results:  Labs reviewed in Epic    ASSESSMENT / PLAN:   Staci was seen today for physical, hearing screening and health maintenance.    Diagnoses and all orders for this visit:    Encounter for Medicare annual wellness exam  -     VENOUS COLLECTION    PMR (polymyalgia rheumatica) (H)  Doing well, now down to prednisone of 12.5 mg   -     Sed Rate Westergren ([facility])  -     VENOUS " COLLECTION    Pure hypercholesterolemia  Discussed current lipid results, previous results (if available) current guidelines (NCEP) for treatment and goals for lipids.    Discussed ongoing lifestyle modification, dietary changes (low fat, low simple carb) and regular aerobic exercise.    Discussed the link between dysmetabolic syndrome and impaired glucose tolerance seen in certain patterns of lipids.     Reviewed medication use for lipid lowering, including the statins are their possible side effects of myalgias, rhabdomyolysis, and liver toxicity.  We today managed his prescriptions with refills ensured to ensure availabilty of current medications.  Discussed the importance for aggressive management of dyslipidemia to prevent vascular complications later.     Instructed to contact me if she develop any intolerance to the treatment.  -     Cancel: Lipid Profile ([facility])  -     VENOUS COLLECTION    Hypertension goal BP (blood pressure) < 140/80  Reviewed her current HTN management. Discussed our goal for her is a systolic pressure at or below 128 and diastolic pressure at or below 83.  We today managed her prescriptions with refills ensured to ensure availabilty of current medications.  Discussed the importance for aggressive management of HTN to prevent vascular complications later.  Recommended lower fat, lower carbohydrate, and lower sodium (<2000 mg)diet. Required intervals for follow up on HTN, lab studies reviewed.    Strongly recommened she follow her blood pressures outside the clinic to ensure that BPs are remaining within guidelines,.  Instructed to contact me if the readings are not within guidelines on a regular basis so we can adjust treatment as needed.  -     CBC with Diff/Plt ([facility])  -     Comp. Metabolic Panel (14) ([facility])  -     EKG 12-lead complete w/read - Clinics  -     VENOUS COLLECTION    Osteopenia of both hips  Wants to avoids  -     VENOUS COLLECTION    Chronic atrial fibrillation (H)  The  "pathophysiology of atrial fibrillation, various causes, possibility of lone atrial fibrillation, risk of thrombus and embolic stroke, need for anticoagulation depending on etiology and Chads Score was discussed with patient and/or relatives. Rate Vs rhythm control approach was discussed including no difference in two strategies in patients enrolled in Affirm and Race trials. In some patients, afib ablation may be an option and was reviewed.  The indication for anticoagulation was discussed with the patient and/or their family in detail. After discussion, the patient decided to see cardiology for possible cardioversion and to defer anticoagulation for now..    -     CARDIOLOGY EVAL ADULT REFERRAL; Future  -     TSH ([facility])        Patient has been advised of split billing requirements and indicates understanding: Yes    COUNSELING:  Reviewed preventive health counseling, as reflected in patient instructions       Regular exercise       Healthy diet/nutrition    Estimated body mass index is 24.83 kg/m  as calculated from the following:    Height as of this encounter: 1.651 m (5' 5\").    Weight as of this encounter: 67.7 kg (149 lb 3.2 oz).        She reports that she quit smoking about 12 years ago. Her smoking use included cigarettes. She has never used smokeless tobacco.    Appropriate preventive services were discussed with this patient, including applicable screening as appropriate for cardiovascular disease, diabetes, osteopenia/osteoporosis, and glaucoma.  As appropriate for age/gender, discussed screening for colorectal cancer, prostate cancer, breast cancer, and cervical cancer. Checklist reviewing preventive services available has been given to the patient.    Reviewed patients plan of care and provided an AVS. The Basic Care Plan (routine screening as documented in Health Maintenance) for Staci meets the Care Plan requirement. This Care Plan has been established and reviewed with the " Patient.    Counseling Resources:  ATP IV Guidelines  Pooled Cohorts Equation Calculator  Breast Cancer Risk Calculator  BRCA-Related Cancer Risk Assessment: FHS-7 Tool  FRAX Risk Assessment  ICSI Preventive Guidelines  Dietary Guidelines for Americans, 2010  USDA's MyPlate  ASA Prophylaxis  Lung CA Screening    Isael Pike MD  [facility]

## 2022-12-01 ENCOUNTER — MEDICAL CORRESPONDENCE (OUTPATIENT)
Dept: HEALTH INFORMATION MANAGEMENT | Facility: CLINIC | Age: 72
End: 2022-12-01

## 2022-12-01 ENCOUNTER — OFFICE VISIT (OUTPATIENT)
Dept: FAMILY MEDICINE | Facility: CLINIC | Age: 72
End: 2022-12-01

## 2022-12-01 VITALS
RESPIRATION RATE: 16 BRPM | HEART RATE: 62 BPM | HEIGHT: 65 IN | BODY MASS INDEX: 24.86 KG/M2 | OXYGEN SATURATION: 99 % | WEIGHT: 149.2 LBS | DIASTOLIC BLOOD PRESSURE: 75 MMHG | SYSTOLIC BLOOD PRESSURE: 112 MMHG

## 2022-12-01 DIAGNOSIS — E78.00 PURE HYPERCHOLESTEROLEMIA: ICD-10-CM

## 2022-12-01 DIAGNOSIS — I10 HYPERTENSION GOAL BP (BLOOD PRESSURE) < 140/80: ICD-10-CM

## 2022-12-01 DIAGNOSIS — I48.91 ATRIAL FIBRILLATION WITH NORMAL VENTRICULAR RATE (H): ICD-10-CM

## 2022-12-01 DIAGNOSIS — Z00.00 ENCOUNTER FOR MEDICARE ANNUAL WELLNESS EXAM: Primary | ICD-10-CM

## 2022-12-01 DIAGNOSIS — M35.3 PMR (POLYMYALGIA RHEUMATICA) (H): ICD-10-CM

## 2022-12-01 DIAGNOSIS — M85.851 OSTEOPENIA OF BOTH HIPS: ICD-10-CM

## 2022-12-01 DIAGNOSIS — M85.852 OSTEOPENIA OF BOTH HIPS: ICD-10-CM

## 2022-12-01 PROBLEM — I48.0 PAROXYSMAL ATRIAL FIBRILLATION (H): Status: ACTIVE | Noted: 2022-12-01

## 2022-12-01 PROBLEM — R93.1 ELEVATED CORONARY ARTERY CALCIUM SCORE: Status: ACTIVE | Noted: 2022-12-01

## 2022-12-01 LAB
% GRANULOCYTES: 56.6 % (ref 42.2–75.2)
ERYTHROCYTE [SEDIMENTATION RATE] IN BLOOD: 11 MM/HR (ref 0–20)
HCT VFR BLD AUTO: 37.3 % (ref 35–46)
HEMOGLOBIN: 12.7 G/DL (ref 11.8–15.5)
LYMPHOCYTES NFR BLD AUTO: 36.3 % (ref 20.5–51.1)
MCH RBC QN AUTO: 32.1 PG (ref 27–31)
MCHC RBC AUTO-ENTMCNC: 34 G/DL (ref 33–37)
MCV RBC AUTO: 94.4 FL (ref 80–100)
MONOCYTES NFR BLD AUTO: 7.1 % (ref 1.7–9.3)
PLATELET # BLD AUTO: 264 K/UL (ref 140–450)
RBC # BLD AUTO: 3.95 X10/CMM (ref 3.7–5.2)
WBC # BLD AUTO: 7 X10/CMM (ref 3.8–11)

## 2022-12-01 PROCEDURE — 85651 RBC SED RATE NONAUTOMATED: CPT | Performed by: FAMILY MEDICINE

## 2022-12-01 PROCEDURE — 85025 COMPLETE CBC W/AUTO DIFF WBC: CPT | Performed by: FAMILY MEDICINE

## 2022-12-01 PROCEDURE — G0439 PPPS, SUBSEQ VISIT: HCPCS | Performed by: FAMILY MEDICINE

## 2022-12-01 PROCEDURE — 36415 COLL VENOUS BLD VENIPUNCTURE: CPT | Performed by: FAMILY MEDICINE

## 2022-12-01 PROCEDURE — 99214 OFFICE O/P EST MOD 30 MIN: CPT | Mod: 25 | Performed by: FAMILY MEDICINE

## 2022-12-01 PROCEDURE — 93000 ELECTROCARDIOGRAM COMPLETE: CPT | Performed by: FAMILY MEDICINE

## 2022-12-01 NOTE — PROGRESS NOTES
HPI and Plan:   See dictation    CURRENT MEDICATIONS:  Current Outpatient Medications   Medication Sig Dispense Refill     alendronate (FOSAMAX) 70 MG tablet Take 1 tablet (70 mg) by mouth every 7 days (Patient not taking: Reported on 12/1/2022) 12 tablet 3     amLODIPine (NORVASC) 10 MG tablet TAKE 1 TABLET BY MOUTH DAILY 90 tablet 3     aspirin 81 MG tablet Take 1 tablet by mouth daily. 90 tablet 3     atorvastatin (LIPITOR) 20 MG tablet TAKE 1 TABLET(20 MG) BY MOUTH DAILY 90 tablet 3     Calcium Carbonate-Vitamin D (SM CALCIUM 500/VITAMIN D3 PO) Take 1 tablet by mouth daily.       Cholecalciferol (VITAMIN D) 1000 UNITS capsule Take 1 capsule by mouth daily 100 capsule 12     Glucosamine-Chondroit-Vit C-Mn (GLUCOSAMINE CHONDROITIN 1500 COMPLEX) CAPS Take 1 capsule by mouth daily.       Multiple Vitamin (MULTI-VITAMIN) per tablet Take 1 tablet by mouth daily. 100 tablet 3     naproxen sodium (ANAPROX) 220 MG tablet Take 220 mg by mouth 2 times daily (with meals)       predniSONE (DELTASONE) 5 MG tablet Take 3 tablets (15 mg) by mouth daily for 21 days, THEN 2.5 tablets (12.5 mg) daily for 21 days, THEN 2 tablets (10 mg) daily for 21 days. 158 tablet 0     predniSONE (DELTASONE) 5 MG tablet Take 1 tablet (5 mg) by mouth daily 30 tablet 0     vitamin C (ASCORBIC ACID) 1000 MG TABS Take 1,000 mg by mouth daily         ALLERGIES     Allergies   Allergen Reactions     Ace Inhibitors      Cough       Sulfamethoxazole W/Trimethoprim Rash     Rash         PAST MEDICAL HISTORY:  Past Medical History:   Diagnosis Date     ACP (advance care planning)     will bring form in 4/1/13     Colon adenoma 01/01/2009     Elevated coronary artery calcium score     total Agatston 2673=434 / 75-90th percentile     Hyperlipidemia LDL goal < 130      Hypertension goal BP (blood pressure) < 140/80      Osteopenia     DEXA -2.0 in July 2013     Paroxysmal atrial fibrillation (H)      PMR (polymyalgia rheumatica) (H)     off pred at end of  - Dr Peter     Pure hypercholesterolemia        PAST SURGICAL HISTORY:  Past Surgical History:   Procedure Laterality Date     BREAST BIOPSY, RT/LT      x 2 - benign       FAMILY HISTORY:  Family History   Problem Relation Age of Onset     Arthritis Mother         balance not great     Other - See Comments Father         fall chest full on blood after fall in the hospital 18     Cancer Maternal Grandmother      Parkinsonism Maternal Grandfather      Neurologic Disorder Paternal Grandmother         bipolar     High cholesterol Brother      High cholesterol Brother      High cholesterol Brother      Hyperlipidemia Sister      Breast Cancer Sister 50     No Known Problems Daughter        SOCIAL HISTORY:  Social History     Socioeconomic History     Marital status: Single   Tobacco Use     Smoking status: Former     Types: Cigarettes     Quit date: 2010     Years since quittin.0     Smokeless tobacco: Never   Substance and Sexual Activity     Alcohol use: Yes     Alcohol/week: 7.0 standard drinks     Types: 7 Glasses of wine per week     Drug use: No     Sexual activity: Not Currently     Partners: Male       Review of Systems:  Skin:          Eyes:         ENT:         Respiratory:          Cardiovascular:         Gastroenterology:        Genitourinary:         Musculoskeletal:         Neurologic:         Psychiatric:         Heme/Lymph/Imm:         Endocrine:           Physical Exam:  Vitals: There were no vitals taken for this visit.    Constitutional:  cooperative, alert and oriented, well developed, well nourished, in no acute distress        Skin:  warm and dry to the touch, no apparent skin lesions or masses noted          Head:  normocephalic, no masses or lesions        Eyes:           Lymph:      ENT:  no pallor or cyanosis, dentition good        Neck:  JVP normal        Respiratory:  clear to auscultation         Cardiac:  regular    GI:  abdomen soft        Extremities and Muscular  Skeletal:                 Neurological:  affect appropriate        Psych:  Alert and Oriented x 3        CC  Isael Pike MD  [address] [facility]  [place] [address]

## 2022-12-02 ENCOUNTER — HOSPITAL ENCOUNTER (OUTPATIENT)
Dept: CARDIOLOGY | Facility: CLINIC | Age: 72
Discharge: HOME | End: 2022-12-02
Attending: INTERNAL MEDICINE | Admitting: INTERNAL MEDICINE
Payer: COMMERCIAL

## 2022-12-02 ENCOUNTER — OFFICE VISIT (OUTPATIENT)
Dept: CARDIOLOGY | Facility: CLINIC | Age: 72
End: 2022-12-02
Attending: FAMILY MEDICINE
Payer: COMMERCIAL

## 2022-12-02 VITALS
WEIGHT: 152.2 LBS | HEART RATE: 86 BPM | SYSTOLIC BLOOD PRESSURE: 130 MMHG | HEIGHT: 66 IN | BODY MASS INDEX: 24.46 KG/M2 | DIASTOLIC BLOOD PRESSURE: 71 MMHG

## 2022-12-02 DIAGNOSIS — I48.91 ATRIAL FIBRILLATION WITH NORMAL VENTRICULAR RATE (H): ICD-10-CM

## 2022-12-02 LAB
ALBUMIN SERPL-MCNC: 4.6 G/DL (ref 3.7–4.7)
ALBUMIN/GLOB SERPL: 2.4 {RATIO} (ref 1.2–2.2)
ALP SERPL-CCNC: 84 IU/L (ref 44–121)
ALT SERPL-CCNC: 20 IU/L (ref 0–32)
AST SERPL-CCNC: 21 IU/L (ref 0–40)
BILIRUB SERPL-MCNC: 0.4 MG/DL (ref 0–1.2)
BUN SERPL-MCNC: 18 MG/DL (ref 8–27)
BUN/CREATININE RATIO: 16 (ref 12–28)
CALCIUM SERPL-MCNC: 9.5 MG/DL (ref 8.7–10.3)
CHLORIDE SERPLBLD-SCNC: 102 MMOL/L (ref 96–106)
CHOLEST SERPL-MCNC: 213 MG/DL (ref 100–199)
CREAT SERPL-MCNC: 1.12 MG/DL (ref 0.57–1)
EGFR: 52 ML/MIN/1.73
GLOBULIN, TOTAL: 1.9 G/DL (ref 1.5–4.5)
GLUCOSE SERPL-MCNC: 96 MG/DL (ref 70–99)
HDLC SERPL-MCNC: 97 MG/DL
LDL/HDL RATIO: 1 RATIO (ref 0–3.2)
LDLC SERPL CALC-MCNC: 97 MG/DL (ref 0–99)
POTASSIUM SERPL-SCNC: 3.6 MMOL/L (ref 3.5–5.2)
PROT SERPL-MCNC: 6.5 G/DL (ref 6–8.5)
SODIUM SERPL-SCNC: 141 MMOL/L (ref 134–144)
TOTAL CO2: 25 MMOL/L (ref 20–29)
TRIGL SERPL-MCNC: 114 MG/DL (ref 0–149)
TSH BLD-ACNC: 2.8 UIU/ML (ref 0.45–4.5)
VLDLC SERPL CALC-MCNC: 19 MG/DL (ref 5–40)

## 2022-12-02 PROCEDURE — 99204 OFFICE O/P NEW MOD 45 MIN: CPT | Performed by: INTERNAL MEDICINE

## 2022-12-02 PROCEDURE — 93246 EXT ECG>7D<15D RECORDING: CPT

## 2022-12-02 PROCEDURE — 93000 ELECTROCARDIOGRAM COMPLETE: CPT | Performed by: INTERNAL MEDICINE

## 2022-12-02 PROCEDURE — 93248 EXT ECG>7D<15D REV&INTERPJ: CPT | Performed by: INTERNAL MEDICINE

## 2022-12-02 NOTE — LETTER
12/2/2022    Isael Pike MD  [address] [place] [address]    RE: Staci Lopez       Dear Colleague,     I had the pleasure of seeing Staci Lopez in the [facility].  HPI and Plan:   See dictation    CURRENT MEDICATIONS:  Current Outpatient Medications   Medication Sig Dispense Refill     alendronate (FOSAMAX) 70 MG tablet Take 1 tablet (70 mg) by mouth every 7 days (Patient not taking: Reported on 12/1/2022) 12 tablet 3     amLODIPine (NORVASC) 10 MG tablet TAKE 1 TABLET BY MOUTH DAILY 90 tablet 3     aspirin 81 MG tablet Take 1 tablet by mouth daily. 90 tablet 3     atorvastatin (LIPITOR) 20 MG tablet TAKE 1 TABLET(20 MG) BY MOUTH DAILY 90 tablet 3     Calcium Carbonate-Vitamin D (SM CALCIUM 500/VITAMIN D3 PO) Take 1 tablet by mouth daily.       Cholecalciferol (VITAMIN D) 1000 UNITS capsule Take 1 capsule by mouth daily 100 capsule 12     Glucosamine-Chondroit-Vit C-Mn (GLUCOSAMINE CHONDROITIN 1500 COMPLEX) CAPS Take 1 capsule by mouth daily.       Multiple Vitamin (MULTI-VITAMIN) per tablet Take 1 tablet by mouth daily. 100 tablet 3     naproxen sodium (ANAPROX) 220 MG tablet Take 220 mg by mouth 2 times daily (with meals)       predniSONE (DELTASONE) 5 MG tablet Take 3 tablets (15 mg) by mouth daily for 21 days, THEN 2.5 tablets (12.5 mg) daily for 21 days, THEN 2 tablets (10 mg) daily for 21 days. 158 tablet 0     predniSONE (DELTASONE) 5 MG tablet Take 1 tablet (5 mg) by mouth daily 30 tablet 0     vitamin C (ASCORBIC ACID) 1000 MG TABS Take 1,000 mg by mouth daily         ALLERGIES     Allergies   Allergen Reactions     Ace Inhibitors      Cough       Sulfamethoxazole W/Trimethoprim Rash     Rash         PAST MEDICAL HISTORY:  Past Medical History:   Diagnosis Date     ACP (advance care planning)     will bring form in 4/1/13     Colon adenoma 01/01/2009     Elevated coronary artery calcium score     total Agatston 2964=891 / 75-90th percentile      Hyperlipidemia LDL goal < 130      Hypertension goal BP (blood pressure) < 140/80      Osteopenia     DEXA -2.0 in 2013     Paroxysmal atrial fibrillation (H)      PMR (polymyalgia rheumatica) (H)     off pred at end of - Dr Peter     Pure hypercholesterolemia        PAST SURGICAL HISTORY:  Past Surgical History:   Procedure Laterality Date     BREAST BIOPSY, RT/LT      x 2 - benign       FAMILY HISTORY:  Family History   Problem Relation Age of Onset     Arthritis Mother         balance not great     Other - See Comments Father         fall chest full on blood after fall in the hospital 18     Cancer Maternal Grandmother      Parkinsonism Maternal Grandfather      Neurologic Disorder Paternal Grandmother         bipolar     High cholesterol Brother      High cholesterol Brother      High cholesterol Brother      Hyperlipidemia Sister      Breast Cancer Sister 50     No Known Problems Daughter        SOCIAL HISTORY:  Social History     Socioeconomic History     Marital status: Single   Tobacco Use     Smoking status: Former     Types: Cigarettes     Quit date: 2010     Years since quittin.0     Smokeless tobacco: Never   Substance and Sexual Activity     Alcohol use: Yes     Alcohol/week: 7.0 standard drinks     Types: 7 Glasses of wine per week     Drug use: No     Sexual activity: Not Currently     Partners: Male       Review of Systems:  Skin:          Eyes:         ENT:         Respiratory:          Cardiovascular:         Gastroenterology:        Genitourinary:         Musculoskeletal:         Neurologic:         Psychiatric:         Heme/Lymph/Imm:         Endocrine:           Physical Exam:  Vitals: There were no vitals taken for this visit.    Constitutional:  cooperative, alert and oriented, well developed, well nourished, in no acute distress        Skin:  warm and dry to the touch, no apparent skin lesions or masses noted          Head:  normocephalic, no masses or lesions         Eyes:           Lymph:      ENT:  no pallor or cyanosis, dentition good        Neck:  JVP normal        Respiratory:  clear to auscultation         Cardiac:  regular    GI:  abdomen soft        Extremities and Muscular Skeletal:                 Neurological:  affect appropriate        Psych:  Alert and Oriented x 3        CC  sIael Pike MD  [address] [place] [address]                Service Date: 12/02/2022    REASON FOR CONSULTATION:  Atrial fibrillation, newly diagnosed.    HISTORY OF PRESENT ILLNESS:  I had the pleasure of seeing Ms. Lopez in consultation at the [facility] today.  She is a very pleasant 72-year-old female who was referred today due to a new diagnosis of atrial fibrillation.    The patient is a generally healthy individual with a past medical history significant for moderate coronary artery calcification on a CT coronary artery calcium scoring study in 2020, as well as polymyalgia rheumatica and treated hypertension and dyslipidemia.  She was recently seen by her primary care physician and an irregular heart rhythm was noted.  A subsequent EKG confirmed the presence of atrial fibrillation with a ventricular rate of 95 beats per minute.  Cardiology consultation was requested.    The patient is a very active individual who works in her yard during the summer and denies any exertional chest pain or dyspnea while doing so.  She exercises on a stationary bike without any limitations.  She denies any syncope or presyncope.  She denies any PND or orthopnea.        Her past medical history, family history, social history, allergies and medications were reviewed in the note in Epic.      PHYSICAL EXAMINATION:  Dictated below.    She has undergone stress echocardiography in 2009, which was negative for ischemia.    Recent labs were reviewed.  They include a lipid panel from 12/01/2022, which demonstrated total cholesterol 213, HDL of 97, LDL of 97 and  triglycerides 114.    Her TSH on the same day was 2.8.    IMPRESSION/PLAN:   Atrial fibrillation, newly diagnosed.      I had a detailed discussion with the patient regarding the pathophysiology and treatment options for atrial fibrillation.  She is minimally symptomatic and, at this point in time, I think it would be reasonable to repeat an EKG given that her rhythm appears regular on auscultation today.      We discussed rate versus rhythm control.  At this point, I would like further information regarding her atrial fibrillation burden and heart rate before making a decision in this regard.  I would also like to obtain a baseline assessment of her left ventricular systolic/valvular function as well as atrial size with an echocardiogram, both of which have been ordered.    I have recommended initiation of Eliquis 5 mg p.o. b.i.d. for thromboembolic prophylaxis given her CHADS-VASc score of 3.  I have sent a prescription to this effect to her pharmacy.    It was a pleasure seeing her in consultation today.    Caorline Mcknight MD        D: 2022   T: 2022   MT: [email or web address]    Name:     DARWIN DUDLEY  MRN:      [id number]        Account:      [id number]   :      1950           Service Date: 2022       Document: [id number]      Thank you for allowing me to participate in the care of your patient.      Sincerely,     Caroline Mcknight MD     [facility]  cc:   Isael Pike MD  [address] [place] [address]

## 2022-12-02 NOTE — PROGRESS NOTES
Service Date: 12/02/2022    REASON FOR CONSULTATION:  Atrial fibrillation, newly diagnosed.    HISTORY OF PRESENT ILLNESS:  I had the pleasure of seeing Ms. Lopez in consultation at the [facility] today.  She is a very pleasant 72-year-old female who was referred today due to a new diagnosis of atrial fibrillation.    The patient is a generally healthy individual with a past medical history significant for moderate coronary artery calcification on a CT coronary artery calcium scoring study in 2020, as well as polymyalgia rheumatica and treated hypertension and dyslipidemia.  She was recently seen by her primary care physician and an irregular heart rhythm was noted.  A subsequent EKG confirmed the presence of atrial fibrillation with a ventricular rate of 95 beats per minute.  Cardiology consultation was requested.    The patient is a very active individual who works in her yard during the summer and denies any exertional chest pain or dyspnea while doing so.  She exercises on a stationary bike without any limitations.  She denies any syncope or presyncope.  She denies any PND or orthopnea.        Her past medical history, family history, social history, allergies and medications were reviewed in the note in Epic.      PHYSICAL EXAMINATION:  Dictated below.    She has undergone stress echocardiography in 2009, which was negative for ischemia.    Recent labs were reviewed.  They include a lipid panel from 12/01/2022, which demonstrated total cholesterol 213, HDL of 97, LDL of 97 and triglycerides 114.    Her TSH on the same day was 2.8.    IMPRESSION/PLAN:   Atrial fibrillation, newly diagnosed.      I had a detailed discussion with the patient regarding the pathophysiology and treatment options for atrial fibrillation.  She is minimally symptomatic and, at this point in time, I think it would be reasonable to repeat an EKG given that her rhythm appears regular on auscultation today.      We  discussed rate versus rhythm control.  At this point, I would like further information regarding her atrial fibrillation burden and heart rate before making a decision in this regard.  I would also like to obtain a baseline assessment of her left ventricular systolic/valvular function as well as atrial size with an echocardiogram, both of which have been ordered.    I have recommended initiation of Eliquis 5 mg p.o. b.i.d. for thromboembolic prophylaxis given her CHADS-VASc score of 3.  I have sent a prescription to this effect to her pharmacy.    It was a pleasure seeing her in consultation today.    Caroline Mcknight MD        D: 2022   T: 2022   MT: [email or web address]    Name:     DARWIN DUDLEY  MRN:      [id number]        Account:      [id number]   :      1950           Service Date: 2022       Document: [id number]

## 2022-12-20 ENCOUNTER — HOSPITAL ENCOUNTER (OUTPATIENT)
Dept: CARDIOLOGY | Facility: CLINIC | Age: 72
Discharge: HOME | End: 2022-12-20
Attending: INTERNAL MEDICINE | Admitting: INTERNAL MEDICINE
Payer: COMMERCIAL

## 2022-12-20 DIAGNOSIS — I48.91 ATRIAL FIBRILLATION WITH NORMAL VENTRICULAR RATE (H): ICD-10-CM

## 2022-12-20 LAB — LVEF ECHO: NORMAL

## 2022-12-20 PROCEDURE — 93306 TTE W/DOPPLER COMPLETE: CPT

## 2022-12-20 PROCEDURE — 93306 TTE W/DOPPLER COMPLETE: CPT | Mod: 26 | Performed by: INTERNAL MEDICINE

## 2022-12-22 ENCOUNTER — HOSPITAL ENCOUNTER (OUTPATIENT)
Dept: MAMMOGRAPHY | Facility: CLINIC | Age: 72
Discharge: HOME | End: 2022-12-22
Admitting: FAMILY MEDICINE
Payer: COMMERCIAL

## 2022-12-22 DIAGNOSIS — Z12.31 VISIT FOR SCREENING MAMMOGRAM: ICD-10-CM

## 2022-12-22 PROCEDURE — 77067 SCR MAMMO BI INCL CAD: CPT

## 2023-01-04 NOTE — PROGRESS NOTES
Cardiology Clinic Progress Note  Staci Lopez MRN# [id number]   YOB: 1950 Age: 72 year old     Primary cardiologist: Dr. Mcknight     Reason for visit: Newly diagnosed atrial fibrillation    History of presenting illness:    Staci Lopez is a pleasant 72 year old patient with past medical history significant for newly diagnosed atrial fibrillation, moderate coronary artery calcification on a CT coronary artery calcium scoring study in 2020, polymyalgia rheumatica, hypertension, and dyslipidemia, who is here today for follow-up.    She was recently seen by Dr. Mcknight on 12/2/2022 after being referred by her primary care physician for new atrial fibrillation noted on EKG. At the time of her visit, she remained quite asymptomatic.  Repeat EKG showed sinus rhythm. Rate versus rhythm control was discussed.  Although, it was first recommended she undergo echocardiogram and event monitor for further evaluation.  Nonetheless, she was initiated on Eliquis 5 mg twice daily for stroke prophylaxis given her [id number] score of 3.    I reviewed findings from her echocardiogram that showed preserved LV function with an EF of 55 to 60%, borderline dilated left atrium, and no hemodynamically significant valvular disease.     Her 14-day ziopatch showed her predominant underlying rhythm was sinus with a minimum heart rate 51 bpm and a max heart rate of 127 bpm, average heart rate 79 bpm.  No atrial fibrillation was noted.  She had 68 runs of SVT, longest lasting 49 seconds, rare PACs and PVCs.    Today Staci tells me she is doing well from a cardiovascular standpoint.  She denies any chest discomfort, shortness of breath, syncope or near syncope, or palpitations.  She remains quite active by going to the gym without any symptoms.  We discussed findings from both her echocardiogram and event monitor in detail.      I reviewed her most recent labs including TSH that was 2.8.  Her BMP showed creatinine of  1.12, GFR 52, normal electrolytes.  Her lipid panel demonstrates a total cholesterol of 213, LDL 97, HDL 97, and triglycerides of 114.           Assessment and Plan:     ASSESSMENT:    1. Paroxysmal atrial fibrillation.  No recurrent atrial fibrillation on event monitor.  Echo shows borderline dilated left atrium, no valvular pathology, preserved LVEF.  EKG today shows sinus rhythm with HR 86 bpm.  She remains quite asymptomatic.  [id number] score of 3, on apixaban 5 mg twice daily for stroke prophylaxis.   2. SVT.  68 runs noted on event monitor, longest lasting 49 seconds.  She was asymptomatic with this.  Not currently on beta-blocker therapy.  3. Hypertension.  Well-controlled on amlodipine 10 mg daily.  4. Hyperlipidemia.  LDL 97 on atorvastatin 20 mg daily.  5. Polymyalgia rheumatica.  On chronic prednisone.      PLAN:     1. Although the patient's event monitor showed no recurrent atrial fibrillation, her left atrium is mild dilated which tells me she has most likely had atrial fibrillation in the past. Given her elevated [id number] score, I recommend continuing apixaban for stroke prophylaxis for now.  2. We could consider 30-day event monitor in 6 to 12 months to re-evaluate burden of atrial fibrillation, if no recurrence, could consider discontinuing anticoagulation at that time.  3. Will hold off on any rate control medication as she remains in sinus rhythm and her blood pressure is well controlled.  4. Follow-up with Dr. Mcknight with EKG in approximately 6 months, sooner if needed.           Violeta Gan, TANIA, APRN, CNP  Page: [phone] (8a-5p M-F)    Orders this Visit:  Orders Placed This Encounter   Procedures     Follow-Up with Cardiology     EKG 12-lead complete w/read - Clinics (performed today)     No orders of the defined types were placed in this encounter.    Medications Discontinued During This Encounter   Medication Reason     aspirin 81 MG tablet Medication Reconciliation Clean Up  "      Today's clinic visit entailed:  Review of the result(s) of each unique test - EKG, labs, echo, event monitor  Ordering of each unique test  Prescription drug management  35 minutes spent on the date of the encounter doing chart review, review of test results, interpretation of tests, patient visit and documentation   Provider  Link to [facility] Help Grid     The level of medical decision making during this visit was of moderate complexity.           Review of Systems:     Review of Systems:  Skin:        Eyes:       ENT:       Respiratory:  Negative    Cardiovascular:  Negative    Gastroenterology:      Genitourinary:       Musculoskeletal:       Neurologic:       Psychiatric:       Heme/Lymph/Imm:  Positive for allergies  Endocrine:  Negative              Physical Exam:   Vitals: /75   Pulse 91   Ht 1.67 m (5' 5.75\")   Wt 68.3 kg (150 lb 8 oz)   BMI 24.48 kg/m    Constitutional:  cooperative        Skin:  warm and dry to the touch        Head:  normocephalic        Eyes:  pupils equal and round        ENT:  no pallor or cyanosis        Neck:  JVP normal        Chest:  clear to auscultation;normal symmetry        Cardiac: regular rhythm;normal S1 and S2;no murmurs, gallops or rubs detected                  Abdomen:  abdomen soft        Vascular: pulses full and equal                                      Extremities and Back:  no edema        Neurological:  no gross motor deficits;affect appropriate             Medications:     Current Outpatient Medications   Medication Sig Dispense Refill     amLODIPine (NORVASC) 10 MG tablet TAKE 1 TABLET BY MOUTH DAILY 90 tablet 3     apixaban ANTICOAGULANT (ELIQUIS ANTICOAGULANT) 5 MG tablet Take 1 tablet (5 mg) by mouth 2 times daily 180 tablet 3     atorvastatin (LIPITOR) 20 MG tablet TAKE 1 TABLET(20 MG) BY MOUTH DAILY 90 tablet 3     Calcium Carbonate-Vitamin D (SM CALCIUM 500/VITAMIN D3 PO) Take 1 tablet by mouth daily.       Cholecalciferol (VITAMIN D) 1000 " UNITS capsule Take 1 capsule by mouth daily 100 capsule 12     Glucosamine-Chondroit-Vit C-Mn (GLUCOSAMINE CHONDROITIN 1500 COMPLEX) CAPS Take 1 capsule by mouth daily.       Multiple Vitamin (MULTI-VITAMIN) per tablet Take 1 tablet by mouth daily. 100 tablet 3     naproxen sodium (ANAPROX) 220 MG tablet Take 220 mg by mouth as needed       predniSONE (DELTASONE) 5 MG tablet Take 3 tablets (15 mg) by mouth daily for 21 days, THEN 2.5 tablets (12.5 mg) daily for 21 days, THEN 2 tablets (10 mg) daily for 21 days. 158 tablet 0     vitamin C (ASCORBIC ACID) 1000 MG TABS Take 1,000 mg by mouth daily       alendronate (FOSAMAX) 70 MG tablet Take 1 tablet (70 mg) by mouth every 7 days (Patient not taking: Reported on 2022) 12 tablet 3     predniSONE (DELTASONE) 5 MG tablet Take 1 tablet (5 mg) by mouth daily (Patient not taking: Reported on 2023) 30 tablet 0       Family History   Problem Relation Age of Onset     Arthritis Mother         balance not great     Other - See Comments Father         fall chest full on blood after fall in the hospital 18     Cancer Maternal Grandmother      Parkinsonism Maternal Grandfather      Neurologic Disorder Paternal Grandmother         bipolar     High cholesterol Brother      High cholesterol Brother      High cholesterol Brother      Hyperlipidemia Sister      Breast Cancer Sister 50     No Known Problems Daughter        Social History     Socioeconomic History     Marital status: Single     Spouse name: Not on file     Number of children: Not on file     Years of education: Not on file     Highest education level: Not on file   Occupational History     Not on file   Tobacco Use     Smoking status: Former     Types: Cigarettes     Quit date: 2010     Years since quittin.1     Smokeless tobacco: Never   Substance and Sexual Activity     Alcohol use: Yes     Alcohol/week: 7.0 standard drinks     Types: 7 Glasses of wine per week     Comment: social     Drug use:  No     Sexual activity: Not Currently     Partners: Male   Other Topics Concern     Parent/sibling w/ CABG, MI or angioplasty before 65F 55M? Not Asked   Social History Narrative     Not on file     Social Determinants of Health     Financial Resource Strain: Not on file   Food Insecurity: Not on file   Transportation Needs: Not on file   Physical Activity: Not on file   Stress: Not on file   Social Connections: Not on file   Intimate Partner Violence: Not on file   Housing Stability: Not on file            Past Medical History:     Past Medical History:   Diagnosis Date     ACP (advance care planning)     will bring form in 4/1/13     Colon adenoma 01/01/2009     Elevated coronary artery calcium score     total Agatston 9139=625 / 75-90th percentile     Hyperlipidemia LDL goal < 130      Hypertension goal BP (blood pressure) < 140/80      Osteopenia     DEXA -2.0 in July 2013     Paroxysmal atrial fibrillation (H)      PMR (polymyalgia rheumatica) (H)     off pred at end of 2015- Dr Peter     Pure hypercholesterolemia               Past Surgical History:     Past Surgical History:   Procedure Laterality Date     BREAST BIOPSY, RT/LT      x 2 - benign              Allergies:   Ace inhibitors and Sulfamethoxazole w/trimethoprim       Data:   All laboratory data reviewed:    Recent Labs   Lab Test 12/01/22  1130 12/11/20  1423 07/10/19  0900   LDL 97 94 104*   HDL 97 72 84   CHOL 213* 179 198   TRIG 114 70 51       Lab Results   Component Value Date    WBC 7.0 12/01/2022    RBC 3.95 12/01/2022    HGB 12.7 12/01/2022    HCT 37.3 12/01/2022    MCV 94.4 12/01/2022    MCH 32.1 (A) 12/01/2022    MCHC 34.0 12/01/2022     12/01/2022       Lab Results   Component Value Date     12/01/2022    POTASSIUM 3.6 12/01/2022    CHLORIDE 102 12/01/2022    GLC 96 12/01/2022    BUN 18 12/01/2022    BUN 16 12/01/2022    CR 1.12 (H) 12/01/2022    LORENZO 9.5 12/01/2022      Lab Results   Component Value Date    AST 21 12/01/2022     ALT 20 12/01/2022       No results found for: A1C    No results found for: INR

## 2023-01-05 ENCOUNTER — OFFICE VISIT (OUTPATIENT)
Dept: CARDIOLOGY | Facility: CLINIC | Age: 73
End: 2023-01-05
Attending: INTERNAL MEDICINE
Payer: COMMERCIAL

## 2023-01-05 VITALS
BODY MASS INDEX: 24.19 KG/M2 | HEIGHT: 66 IN | DIASTOLIC BLOOD PRESSURE: 75 MMHG | SYSTOLIC BLOOD PRESSURE: 137 MMHG | WEIGHT: 150.5 LBS | HEART RATE: 91 BPM

## 2023-01-05 DIAGNOSIS — I48.0 PAROXYSMAL ATRIAL FIBRILLATION (H): Primary | ICD-10-CM

## 2023-01-05 PROCEDURE — 93000 ELECTROCARDIOGRAM COMPLETE: CPT | Performed by: NURSE PRACTITIONER

## 2023-01-05 PROCEDURE — 99214 OFFICE O/P EST MOD 30 MIN: CPT | Performed by: NURSE PRACTITIONER

## 2023-01-05 NOTE — LETTER
1/5/2023    Isael Pike MD  [address]  [facility] [address]    RE: Staci Lopez       Dear Colleague,     I had the pleasure of seeing Staci Lopez in the [facility].  Cardiology Clinic Progress Note  Staci Lopez MRN# [id number]   YOB: 1950 Age: 72 year old     Primary cardiologist: Dr. Mcknight     Reason for visit: Newly diagnosed atrial fibrillation    History of presenting illness:    Staci Lopez is a pleasant 72 year old patient with past medical history significant for newly diagnosed atrial fibrillation, moderate coronary artery calcification on a CT coronary artery calcium scoring study in 2020, polymyalgia rheumatica, hypertension, and dyslipidemia, who is here today for follow-up.    She was recently seen by Dr. Mcknight on 12/2/2022 after being referred by her primary care physician for new atrial fibrillation noted on EKG. At the time of her visit, she remained quite asymptomatic.  Repeat EKG showed sinus rhythm. Rate versus rhythm control was discussed.  Although, it was first recommended she undergo echocardiogram and event monitor for further evaluation.  Nonetheless, she was initiated on Eliquis 5 mg twice daily for stroke prophylaxis given her [id number] score of 3.    I reviewed findings from her echocardiogram that showed preserved LV function with an EF of 55 to 60%, borderline dilated left atrium, and no hemodynamically significant valvular disease.     Her 14-day ziopatch showed her predominant underlying rhythm was sinus with a minimum heart rate 51 bpm and a max heart rate of 127 bpm, average heart rate 79 bpm.  No atrial fibrillation was noted.  She had 68 runs of SVT, longest lasting 49 seconds, rare PACs and PVCs.    Today Staci tells me she is doing well from a cardiovascular standpoint.  She denies any chest discomfort, shortness of breath, syncope or near syncope, or palpitations.  She remains quite active  by going to the gym without any symptoms.  We discussed findings from both her echocardiogram and event monitor in detail.      I reviewed her most recent labs including TSH that was 2.8.  Her BMP showed creatinine of 1.12, GFR 52, normal electrolytes.  Her lipid panel demonstrates a total cholesterol of 213, LDL 97, HDL 97, and triglycerides of 114.           Assessment and Plan:     ASSESSMENT:    1. Paroxysmal atrial fibrillation.  No recurrent atrial fibrillation on event monitor.  Echo shows borderline dilated left atrium, no valvular pathology, preserved LVEF.  EKG today shows sinus rhythm with HR 86 bpm.  She remains quite asymptomatic.  [id number] score of 3, on apixaban 5 mg twice daily for stroke prophylaxis.   2. SVT.  68 runs noted on event monitor, longest lasting 49 seconds.  She was asymptomatic with this.  Not currently on beta-blocker therapy.  3. Hypertension.  Well-controlled on amlodipine 10 mg daily.  4. Hyperlipidemia.  LDL 97 on atorvastatin 20 mg daily.  5. Polymyalgia rheumatica.  On chronic prednisone.      PLAN:     1. Although the patient's event monitor showed no recurrent atrial fibrillation, her left atrium is mild dilated which tells me she has most likely had atrial fibrillation in the past. Given her elevated [id number] score, I recommend continuing apixaban for stroke prophylaxis for now.  2. We could consider 30-day event monitor in 6 to 12 months to re-evaluate burden of atrial fibrillation, if no recurrence, could consider discontinuing anticoagulation at that time.  3. Will hold off on any rate control medication as she remains in sinus rhythm and her blood pressure is well controlled.  4. Follow-up with Dr. Mcknight with EKG in approximately 6 months, sooner if needed.           Violeta Gan, TANIA, APRN, CNP  Page: [phone] (8a-5p M-F)    Orders this Visit:  Orders Placed This Encounter   Procedures     Follow-Up with Cardiology     EKG 12-lead complete w/read - Clinics  "(performed today)     No orders of the defined types were placed in this encounter.    Medications Discontinued During This Encounter   Medication Reason     aspirin 81 MG tablet Medication Reconciliation Clean Up       Today's clinic visit entailed:  Review of the result(s) of each unique test - EKG, labs, echo, event monitor  Ordering of each unique test  Prescription drug management  35 minutes spent on the date of the encounter doing chart review, review of test results, interpretation of tests, patient visit and documentation   Provider  Link to [facility] Help Grid     The level of medical decision making during this visit was of moderate complexity.           Review of Systems:     Review of Systems:  Skin:        Eyes:       ENT:       Respiratory:  Negative    Cardiovascular:  Negative    Gastroenterology:      Genitourinary:       Musculoskeletal:       Neurologic:       Psychiatric:       Heme/Lymph/Imm:  Positive for allergies  Endocrine:  Negative              Physical Exam:   Vitals: /75   Pulse 91   Ht 1.67 m (5' 5.75\")   Wt 68.3 kg (150 lb 8 oz)   BMI 24.48 kg/m    Constitutional:  cooperative        Skin:  warm and dry to the touch        Head:  normocephalic        Eyes:  pupils equal and round        ENT:  no pallor or cyanosis        Neck:  JVP normal        Chest:  clear to auscultation;normal symmetry        Cardiac: regular rhythm;normal S1 and S2;no murmurs, gallops or rubs detected                  Abdomen:  abdomen soft        Vascular: pulses full and equal                                      Extremities and Back:  no edema        Neurological:  no gross motor deficits;affect appropriate             Medications:     Current Outpatient Medications   Medication Sig Dispense Refill     amLODIPine (NORVASC) 10 MG tablet TAKE 1 TABLET BY MOUTH DAILY 90 tablet 3     apixaban ANTICOAGULANT (ELIQUIS ANTICOAGULANT) 5 MG tablet Take 1 tablet (5 mg) by mouth 2 times daily 180 tablet 3     " atorvastatin (LIPITOR) 20 MG tablet TAKE 1 TABLET(20 MG) BY MOUTH DAILY 90 tablet 3     Calcium Carbonate-Vitamin D (SM CALCIUM 500/VITAMIN D3 PO) Take 1 tablet by mouth daily.       Cholecalciferol (VITAMIN D) 1000 UNITS capsule Take 1 capsule by mouth daily 100 capsule 12     Glucosamine-Chondroit-Vit C-Mn (GLUCOSAMINE CHONDROITIN 1500 COMPLEX) CAPS Take 1 capsule by mouth daily.       Multiple Vitamin (MULTI-VITAMIN) per tablet Take 1 tablet by mouth daily. 100 tablet 3     naproxen sodium (ANAPROX) 220 MG tablet Take 220 mg by mouth as needed       predniSONE (DELTASONE) 5 MG tablet Take 3 tablets (15 mg) by mouth daily for 21 days, THEN 2.5 tablets (12.5 mg) daily for 21 days, THEN 2 tablets (10 mg) daily for 21 days. 158 tablet 0     vitamin C (ASCORBIC ACID) 1000 MG TABS Take 1,000 mg by mouth daily       alendronate (FOSAMAX) 70 MG tablet Take 1 tablet (70 mg) by mouth every 7 days (Patient not taking: Reported on 12/1/2022) 12 tablet 3     predniSONE (DELTASONE) 5 MG tablet Take 1 tablet (5 mg) by mouth daily (Patient not taking: Reported on 1/5/2023) 30 tablet 0       Family History   Problem Relation Age of Onset     Arthritis Mother         balance not great     Other - See Comments Father         fall chest full on blood after fall in the hospital 6/5/18     Cancer Maternal Grandmother      Parkinsonism Maternal Grandfather      Neurologic Disorder Paternal Grandmother         bipolar     High cholesterol Brother      High cholesterol Brother      High cholesterol Brother      Hyperlipidemia Sister      Breast Cancer Sister 50     No Known Problems Daughter        Social History     Socioeconomic History     Marital status: Single     Spouse name: Not on file     Number of children: Not on file     Years of education: Not on file     Highest education level: Not on file   Occupational History     Not on file   Tobacco Use     Smoking status: Former     Types: Cigarettes     Quit date: 11/1/2010      Years since quittin.1     Smokeless tobacco: Never   Substance and Sexual Activity     Alcohol use: Yes     Alcohol/week: 7.0 standard drinks     Types: 7 Glasses of wine per week     Comment: social     Drug use: No     Sexual activity: Not Currently     Partners: Male   Other Topics Concern     Parent/sibling w/ CABG, MI or angioplasty before 65F 55M? Not Asked   Social History Narrative     Not on file     Social Determinants of Health     Financial Resource Strain: Not on file   Food Insecurity: Not on file   Transportation Needs: Not on file   Physical Activity: Not on file   Stress: Not on file   Social Connections: Not on file   Intimate Partner Violence: Not on file   Housing Stability: Not on file            Past Medical History:     Past Medical History:   Diagnosis Date     ACP (advance care planning)     will bring form in 13     Colon adenoma 2009     Elevated coronary artery calcium score     total Agatston 9633=995 / 75-90th percentile     Hyperlipidemia LDL goal < 130      Hypertension goal BP (blood pressure) < 140/80      Osteopenia     DEXA -2.0 in 2013     Paroxysmal atrial fibrillation (H)      PMR (polymyalgia rheumatica) (H)     off pred at end of - Dr Peter     Pure hypercholesterolemia               Past Surgical History:     Past Surgical History:   Procedure Laterality Date     BREAST BIOPSY, RT/LT      x 2 - benign              Allergies:   Ace inhibitors and Sulfamethoxazole w/trimethoprim       Data:   All laboratory data reviewed:    Recent Labs   Lab Test 22  1130 20  1423 07/10/19  0900   LDL 97 94 104*   HDL 97 72 84   CHOL 213* 179 198   TRIG 114 70 51       Lab Results   Component Value Date    WBC 7.0 2022    RBC 3.95 2022    HGB 12.7 2022    HCT 37.3 2022    MCV 94.4 2022    MCH 32.1 (A) 2022    MCHC 34.0 2022     2022       Lab Results   Component Value Date     2022     POTASSIUM 3.6 12/01/2022    CHLORIDE 102 12/01/2022    GLC 96 12/01/2022    BUN 18 12/01/2022    BUN 16 12/01/2022    CR 1.12 (H) 12/01/2022    LORENZO 9.5 12/01/2022      Lab Results   Component Value Date    AST 21 12/01/2022    ALT 20 12/01/2022       No results found for: A1C    No results found for: INR      Thank you for allowing me to participate in the care of your patient.      Sincerely,     Violeta Gan, [facility]  cc:   Caroline Mcknight MD  [address]  [place] [address]

## 2023-01-10 DIAGNOSIS — M35.3 PMR (POLYMYALGIA RHEUMATICA) (H): ICD-10-CM

## 2023-01-10 RX ORDER — PREDNISONE 5 MG/1
10 TABLET ORAL DAILY
Qty: 60 TABLET | Refills: 0 | Status: SHIPPED | OUTPATIENT
Start: 2023-01-10 | End: 2023-02-08

## 2023-01-10 NOTE — TELEPHONE ENCOUNTER
Patient calls requesting refill on prednisone 5mg tabs.  Has PMR and slowly weaning off prednisone. Currently taking 10mg QD for 21 days then will RTC for ESR in ~10 days and if WNL and feeling well will be decreasing dose again.   Only has 2 tabs of pred 5mg left.   Last related visit: 22 with Dr. Pike   Plan: okay per Dr. Pike for refill of prednisone 5mg #60 si po QD or as directed.      No

## 2023-01-26 DIAGNOSIS — M35.3 PMR (POLYMYALGIA RHEUMATICA) (H): ICD-10-CM

## 2023-01-26 LAB — ERYTHROCYTE [SEDIMENTATION RATE] IN BLOOD: 12 MM/HR (ref 0–20)

## 2023-01-26 PROCEDURE — 36415 COLL VENOUS BLD VENIPUNCTURE: CPT | Performed by: FAMILY MEDICINE

## 2023-01-26 PROCEDURE — 85651 RBC SED RATE NONAUTOMATED: CPT | Performed by: FAMILY MEDICINE

## 2023-01-29 NOTE — RESULT ENCOUNTER NOTE
Dear Staci,     I am writing to report that your included test results are as expected.    Many labs contain some results that are slightly outside of the normal range, I have reviewed any of these results and they require no changes at this time.    Isael Pike MD

## 2023-02-08 DIAGNOSIS — M35.3 PMR (POLYMYALGIA RHEUMATICA) (H): ICD-10-CM

## 2023-02-08 RX ORDER — PREDNISONE 5 MG/1
10 TABLET ORAL DAILY
Qty: 120 TABLET | Refills: 0 | Status: SHIPPED | OUTPATIENT
Start: 2023-02-08 | End: 2023-04-06

## 2023-02-08 NOTE — TELEPHONE ENCOUNTER
Patient calls requesting refill on prednisone 5mg tabs. Currently taking 10mg QD for PMR. Had been slowly decreasing dose q month from 15mg every day and last ESR was normal at 12 but has yet not reduced from 10mg QD as had planned because still having achy neck and shoulders. Is going to be in [place] for month of March and would like to stay on 10mg QD if okay with Dr. Pike until back then will reconsider decreasing dose.         Component      Latest Ref Rng & Units 9/20/2022 11/8/2022 12/1/2022 1/26/2023   Sed Rate      0 - 20 mm/hr 10 38 (A) 11 12        Plan: okay per Dr. Pike to continue pred 10mg QD for now. Patient should follow up with Dr. Pike for visit regarding this in April. Patient informed and agrees.  Lorene Quiroga RN

## 2023-04-06 ENCOUNTER — OFFICE VISIT (OUTPATIENT)
Dept: FAMILY MEDICINE | Facility: CLINIC | Age: 73
End: 2023-04-06

## 2023-04-06 VITALS
SYSTOLIC BLOOD PRESSURE: 154 MMHG | DIASTOLIC BLOOD PRESSURE: 71 MMHG | HEART RATE: 75 BPM | RESPIRATION RATE: 16 BRPM | WEIGHT: 155.6 LBS | HEIGHT: 66 IN | BODY MASS INDEX: 25.01 KG/M2 | OXYGEN SATURATION: 98 %

## 2023-04-06 DIAGNOSIS — M35.3 PMR (POLYMYALGIA RHEUMATICA) (H): ICD-10-CM

## 2023-04-06 DIAGNOSIS — M19.90 ARTHRITIS: ICD-10-CM

## 2023-04-06 LAB — ERYTHROCYTE [SEDIMENTATION RATE] IN BLOOD: 13 MM/HR (ref 0–20)

## 2023-04-06 PROCEDURE — 85651 RBC SED RATE NONAUTOMATED: CPT | Performed by: FAMILY MEDICINE

## 2023-04-06 PROCEDURE — 36415 COLL VENOUS BLD VENIPUNCTURE: CPT | Performed by: FAMILY MEDICINE

## 2023-04-06 PROCEDURE — 99213 OFFICE O/P EST LOW 20 MIN: CPT | Performed by: FAMILY MEDICINE

## 2023-04-06 RX ORDER — PREDNISONE 1 MG/1
5 TABLET ORAL DAILY
Qty: 360 TABLET | Refills: 1 | Status: SHIPPED | OUTPATIENT
Start: 2023-04-06 | End: 2024-09-26

## 2023-04-06 RX ORDER — PREDNISONE 5 MG/1
5 TABLET ORAL DAILY
Qty: 90 TABLET | Refills: 0 | Status: SHIPPED | OUTPATIENT
Start: 2023-04-06 | End: 2023-07-18

## 2023-04-06 NOTE — PROGRESS NOTES
"PMR fullow up.  Still on prednisone.  Problem(s) Oriented visit      ROS:  General and Resp. completed and negative except as noted above     HISTORY:   reports current alcohol use of about 7.0 standard drinks of alcohol per week.   reports that she quit smoking about 12 years ago. Her smoking use included cigarettes. She has never used smokeless tobacco.    Past Medical History:   Diagnosis Date     ACP (advance care planning)      Colon adenoma 01/01/2009     Elevated coronary artery calcium score      Hyperlipidemia LDL goal < 130      Hypertension goal BP (blood pressure) < 140/80      Osteopenia      Paroxysmal atrial fibrillation (H)      PMR (polymyalgia rheumatica) (H)      Pure hypercholesterolemia      Past Surgical History:   Procedure Laterality Date     BREAST BIOPSY, RT/LT      x 2 - benign       EXAM:  BP: 154/71   Pulse: 75    Temp: Data Unavailable    Wt Readings from Last 2 Encounters:   04/06/23 70.6 kg (155 lb 9.6 oz)   01/05/23 68.3 kg (150 lb 8 oz)       BMI= Body mass index is 25.31 kg/m .    EXAM:  APPEARANCE: = Relaxed and in no distress      Assessment/Plan:  Staci was seen today for recheck medication and health maintenance.    Diagnoses and all orders for this visit:    PMR (polymyalgia rheumatica) (H)  -     predniSONE (DELTASONE) 5 MG tablet; Take 1 tablet (5 mg) by mouth daily Use with 1 mg tablets to gradually wean down on total dose as directed.  -     Sed Rate Westergren (RMG); Standing    Arthritis  -     predniSONE (DELTASONE) 1 MG tablet; Take 5 tablets (5 mg) by mouth daily        COUNSELING:   reports that she quit smoking about 12 years ago. Her smoking use included cigarettes. She has never used smokeless tobacco.    Estimated body mass index is 25.31 kg/m  as calculated from the following:    Height as of this encounter: 1.67 m (5' 5.75\").    Weight as of this encounter: 70.6 kg (155 lb 9.6 oz).       Appropriate preventive services were discussed with this patient, " including applicable screening as appropriate for cardiovascular disease, diabetes, osteopenia/osteoporosis, and glaucoma.  As appropriate for age/gender, discussed screening for colorectal cancer, prostate cancer, breast cancer, and cervical cancer. Checklist reviewing preventive services available has been given to the patient.    Reviewed patients plan of care and provided an AVS. The Basic Care Plan (routine screening as documented in Health Maintenance) for Staci meets the Care Plan requirement. This Care Plan has been established and reviewed with the  Patient.      The following health maintenance items are reviewed in Epic and correct as of today:  Health Maintenance   Topic Date Due     DTAP/TDAP/TD IMMUNIZATION (1 - Tdap) 10/17/2006     COLORECTAL CANCER SCREENING  11/06/2020     COVID-19 Vaccine (5 - Booster for Pfizer series) 09/20/2022     PHQ-2 (once per calendar year)  01/01/2023     MEDICARE ANNUAL WELLNESS VISIT  12/01/2023     FALL RISK ASSESSMENT  12/01/2023     MAMMO SCREENING  12/22/2023     DEXA  10/11/2025     LIPID  12/01/2027     ADVANCE CARE PLANNING  12/01/2027     HEPATITIS C SCREENING  Completed     INFLUENZA VACCINE  Completed     Pneumococcal Vaccine: 65+ Years  Completed     ZOSTER IMMUNIZATION  Completed     IPV IMMUNIZATION  Aged Out     MENINGITIS IMMUNIZATION  Aged Out     LUNG CANCER SCREENING  Discontinued       Isael Pike  [facility]  For any issues my office # is [phone]

## 2023-05-09 DIAGNOSIS — M35.3 PMR (POLYMYALGIA RHEUMATICA) (H): ICD-10-CM

## 2023-05-09 LAB — ERYTHROCYTE [SEDIMENTATION RATE] IN BLOOD: 8 MM/HR (ref 0–20)

## 2023-05-09 PROCEDURE — 85651 RBC SED RATE NONAUTOMATED: CPT | Performed by: FAMILY MEDICINE

## 2023-05-09 PROCEDURE — 36415 COLL VENOUS BLD VENIPUNCTURE: CPT | Performed by: FAMILY MEDICINE

## 2023-06-13 DIAGNOSIS — M35.3 PMR (POLYMYALGIA RHEUMATICA) (H): ICD-10-CM

## 2023-06-13 LAB — ERYTHROCYTE [SEDIMENTATION RATE] IN BLOOD: 23 MM/HR (ref 0–20)

## 2023-06-13 PROCEDURE — 36415 COLL VENOUS BLD VENIPUNCTURE: CPT | Performed by: FAMILY MEDICINE

## 2023-06-13 PROCEDURE — 85651 RBC SED RATE NONAUTOMATED: CPT | Performed by: FAMILY MEDICINE

## 2023-06-29 DIAGNOSIS — I10 HYPERTENSION GOAL BP (BLOOD PRESSURE) < 140/80: ICD-10-CM

## 2023-06-29 DIAGNOSIS — Z76.0 ENCOUNTER FOR MEDICATION REFILL: ICD-10-CM

## 2023-06-29 RX ORDER — ATORVASTATIN CALCIUM 20 MG/1
TABLET, FILM COATED ORAL
Qty: 90 TABLET | Refills: 1 | Status: SHIPPED | OUTPATIENT
Start: 2023-06-29 | End: 2023-07-20

## 2023-06-29 RX ORDER — AMLODIPINE BESYLATE 10 MG/1
TABLET ORAL
Qty: 90 TABLET | Refills: 1 | Status: SHIPPED | OUTPATIENT
Start: 2023-06-29 | End: 2023-07-28

## 2023-06-29 NOTE — TELEPHONE ENCOUNTER
Amlodipine and Atorvastatin. LOV 4/06/23 unrelated.  Last addressed 12/01/22.    Pure hypercholesterolemia  Discussed current lipid results, previous results (if available) current guidelines (NCEP) for treatment and goals for lipids.    Discussed ongoing lifestyle modification, dietary changes (low fat, low simple carb) and regular aerobic exercise.    Discussed the link between dysmetabolic syndrome and impaired glucose tolerance seen in certain patterns of lipids.     Reviewed medication use for lipid lowering, including the statins are their possible side effects of myalgias, rhabdomyolysis, and liver toxicity.  We today managed his prescriptions with refills ensured to ensure availabilty of current medications.  Discussed the importance for aggressive management of dyslipidemia to prevent vascular complications later.     Instructed to contact me if she develop any intolerance to the treatment.  Cholesterol   Date Value Ref Range Status   12/01/2022 213 (H) 100 - 199 mg/dL Final   12/11/2020 179 100 - 199 mg/dL Final     HDL Cholesterol   Date Value Ref Range Status   12/01/2022 97 >39 mg/dL Final   12/11/2020 72 >39 mg/dL Final     LDL Cholesterol Calculated   Date Value Ref Range Status   12/01/2022 97 0 - 99 mg/dL Final   12/11/2020 94 0 - 99 mg/dL Final     Triglycerides   Date Value Ref Range Status   12/01/2022 114 0 - 149 mg/dL Final   12/11/2020 70 0 - 149 mg/dL Final     Cholesterol/HDL Ratio   Date Value Ref Range Status   04/27/2013 3.0  Final   02/18/2011 3.7  Final       Hypertension goal BP (blood pressure) < 140/80  Reviewed her current HTN management. Discussed our goal for her is a systolic pressure at or below 128 and diastolic pressure at or below 83.  We today managed her prescriptions with refills ensured to ensure availabilty of current medications.  Discussed the importance for aggressive management of HTN to prevent vascular complications later.  Recommended lower fat, lower  carbohydrate, and lower sodium (<2000 mg)diet. Required intervals for follow up on HTN, lab studies reviewed.    Strongly recommened she follow her blood pressures outside the clinic to ensure that BPs are remaining within guidelines,.  Instructed to contact me if the readings are not within guidelines on a regular basis so we can adjust treatment as needed.  BP Readings from Last 3 Encounters:   04/06/23 (!) 154/71   01/05/23 137/75   12/02/22 130/71

## 2023-07-14 DIAGNOSIS — M35.3 PMR (POLYMYALGIA RHEUMATICA) (H): ICD-10-CM

## 2023-07-14 LAB — ERYTHROCYTE [SEDIMENTATION RATE] IN BLOOD: 12 MM/HR (ref 0–20)

## 2023-07-14 PROCEDURE — 85651 RBC SED RATE NONAUTOMATED: CPT | Performed by: FAMILY MEDICINE

## 2023-07-14 PROCEDURE — 36415 COLL VENOUS BLD VENIPUNCTURE: CPT | Performed by: FAMILY MEDICINE

## 2023-07-18 DIAGNOSIS — M35.3 PMR (POLYMYALGIA RHEUMATICA) (H): ICD-10-CM

## 2023-07-18 RX ORDER — PREDNISONE 5 MG/1
5 TABLET ORAL DAILY
Qty: 90 TABLET | Refills: 0 | Status: SHIPPED | OUTPATIENT
Start: 2023-07-18 | End: 2023-10-03

## 2023-07-18 NOTE — PROGRESS NOTES
HPI and Plan:     I had the pleasure of seeing Ms. Lopez in follow up at the [facility] today.  She is a very pleasant 72-year-old female who I saw in 12/2022 for a new diagnosis of atrial fibrillation.    The patient is a generally healthy individual with a past medical history significant for moderate coronary artery calcification on a CT coronary artery calcium scoring study in 2020, as well as polymyalgia rheumatica and treated hypertension and dyslipidemia.  She was recently seen by her primary care physician and an irregular heart rhythm was noted.  A subsequent EKG confirmed the presence of atrial fibrillation with a ventricular rate of 95 beats per minute.  Cardiology consultation was requested.    She was initiated on Eliquis for thromboembolic prophylaxis and a Zio patch monitor was subsequently performed to evaluate for her atrial fibrillation burden.  This demonstrated no evidence of atrial fibrillation with 68 runs of SVT.  Given that she was completely asymptomatic, we recommended continuation of anticoagulation and withholding any rate/rhythm control therapy given no evidence of atrial fibrillation and her asymptomatic status.    An echocardiogram had earlier demonstrated normal left ventricular size and systolic function with borderline dilatation of the left atrium.  This was performed on 12/20/2022.    She did undergo Zio patch monitoring in December 2022 which demonstrated sinus rhythm with 68 runs of SVT, the longest being 49 seconds.  Rare PACs and PVCs were noted.    Today she feels well overall from a cardiovascular standpoint.  She specifically denies any chest discomfort, palpitations, syncope or presyncope.  She denies any PND orthopnea.  She walks 2 to 5 miles most days of the week without any limitations.  She has been taking all her medications as prescribed including her Eliquis.    She does bring up occasional shoulder/back discomfort.  She does have polymyalgia  rheumatica and attributes her symptoms to this.  She has not noted a clear exertional component to the symptoms.    PHYSICAL EXAMINATION:  Dictated below.    She has undergone stress echocardiography in 2009, which was negative for ischemia.    Recent labs were reviewed.  They include a lipid panel from 12/01/2022, which demonstrated total cholesterol 213, HDL of 97, LDL of 97 and triglycerides 114.    Her TSH on the same day was 2.8.    IMPRESSION/PLAN:     Paroxysmal atrial fibrillation.  Moderate coronary artery calcification.  Dyslipidemia.  LDL suboptimal on atorvastatin 20 mg daily.      Ms. Lopez is doing well overall from a cardiovascular standpoint.  There is no evidence of angina, congestive heart failure or symptomatic recurrent atrial fibrillation.  We did discuss scheduling a stress echocardiogram to evaluate her symptoms of shoulder/back discomfort further, and she will consider this and inform us if she wishes to proceed.    From a risk factor modification standpoint, I recommended that we discontinue atorvastatin and start rosuvastatin 20 mg daily for more optimal lipid lowering.  A follow-up lipid panel has been ordered which will be performed in approximately 2 to 3 months.    It was a pleasure seeing her in follow-up today.      CURRENT MEDICATIONS:  Current Outpatient Medications   Medication Sig Dispense Refill    alendronate (FOSAMAX) 70 MG tablet Take 1 tablet (70 mg) by mouth every 7 days (Patient not taking: Reported on 12/1/2022) 12 tablet 3    amLODIPine (NORVASC) 10 MG tablet TAKE 1 TABLET BY MOUTH DAILY 90 tablet 1    apixaban ANTICOAGULANT (ELIQUIS ANTICOAGULANT) 5 MG tablet Take 1 tablet (5 mg) by mouth 2 times daily 180 tablet 3    atorvastatin (LIPITOR) 20 MG tablet TAKE 1 TABLET(20 MG) BY MOUTH DAILY 90 tablet 1    Calcium Carbonate-Vitamin D (SM CALCIUM 500/VITAMIN D3 PO) Take 1 tablet by mouth daily.      Cholecalciferol (VITAMIN D) 1000 UNITS capsule Take 1 capsule by mouth  daily 100 capsule 12    Glucosamine-Chondroit-Vit C-Mn (GLUCOSAMINE CHONDROITIN 1500 COMPLEX) CAPS Take 1 capsule by mouth daily.      Multiple Vitamin (MULTI-VITAMIN) per tablet Take 1 tablet by mouth daily. 100 tablet 3    naproxen sodium (ANAPROX) 220 MG tablet Take 220 mg by mouth as needed (Patient not taking: Reported on 4/6/2023)      predniSONE (DELTASONE) 1 MG tablet Take 5 tablets (5 mg) by mouth daily 360 tablet 1    predniSONE (DELTASONE) 5 MG tablet Take 1 tablet (5 mg) by mouth daily Use with 1 mg tablets to gradually wean down on total dose as directed. 90 tablet 0    vitamin C (ASCORBIC ACID) 1000 MG TABS Take 1,000 mg by mouth daily         ALLERGIES     Allergies   Allergen Reactions    Ace Inhibitors      Cough      Sulfamethoxazole-Trimethoprim Rash     Rash         PAST MEDICAL HISTORY:  Past Medical History:   Diagnosis Date    ACP (advance care planning)     will bring form in 4/1/13    Colon adenoma 01/01/2009    Elevated coronary artery calcium score     total Agatston 5512=158 / 75-90th percentile    Hyperlipidemia LDL goal < 130     Hypertension goal BP (blood pressure) < 140/80     Osteopenia     DEXA -2.0 in July 2013    Paroxysmal atrial fibrillation (H)     PMR (polymyalgia rheumatica) (H)     off pred at end of 2015- Dr Peter    Pure hypercholesterolemia        PAST SURGICAL HISTORY:  Past Surgical History:   Procedure Laterality Date    BREAST BIOPSY, RT/LT      x 2 - benign       FAMILY HISTORY:  Family History   Problem Relation Age of Onset    Arthritis Mother         balance not great    Other - See Comments Father         fall chest full on blood after fall in the hospital 6/5/18    Cancer Maternal Grandmother     Parkinsonism Maternal Grandfather     Neurologic Disorder Paternal Grandmother         bipolar    High cholesterol Brother     High cholesterol Brother     High cholesterol Brother     Hyperlipidemia Sister     Breast Cancer Sister 50    No Known Problems Daughter         SOCIAL HISTORY:  Social History     Socioeconomic History    Marital status: Single   Tobacco Use    Smoking status: Former     Types: Cigarettes     Quit date: 2010     Years since quittin.7    Smokeless tobacco: Never   Substance and Sexual Activity    Alcohol use: Yes     Alcohol/week: 7.0 standard drinks of alcohol     Types: 7 Glasses of wine per week     Comment: social    Drug use: No    Sexual activity: Not Currently     Partners: Male       Review of Systems:  Skin:          Eyes:         ENT:         Respiratory:          Cardiovascular:         Gastroenterology:        Genitourinary:         Musculoskeletal:         Neurologic:         Psychiatric:         Heme/Lymph/Imm:         Endocrine:           Physical Exam:  Vitals: There were no vitals taken for this visit.    Constitutional:           Skin:             Head:           Eyes:           Lymph:      ENT:           Neck:           Respiratory:            Cardiac:                                                           GI:           Extremities and Muscular Skeletal:                 Neurological:           Psych:           CC  Violeta Gan, CNP  [address] [place] [address]

## 2023-07-19 NOTE — RESULT ENCOUNTER NOTE
Dear Staci,     I am writing to report that your included test results are as expected.    Many labs contain some results that are slightly outside of the normal range, I have reviewed any of these results and they require no changes at this time.    Isale Pike MD

## 2023-07-20 ENCOUNTER — OFFICE VISIT (OUTPATIENT)
Dept: CARDIOLOGY | Facility: CLINIC | Age: 73
End: 2023-07-20
Attending: NURSE PRACTITIONER
Payer: COMMERCIAL

## 2023-07-20 VITALS
OXYGEN SATURATION: 100 % | BODY MASS INDEX: 25.1 KG/M2 | RESPIRATION RATE: 17 BRPM | HEIGHT: 64 IN | SYSTOLIC BLOOD PRESSURE: 126 MMHG | HEART RATE: 80 BPM | DIASTOLIC BLOOD PRESSURE: 75 MMHG | WEIGHT: 147 LBS

## 2023-07-20 DIAGNOSIS — E78.5 DYSLIPIDEMIA (HIGH LDL; LOW HDL): ICD-10-CM

## 2023-07-20 DIAGNOSIS — I48.0 PAROXYSMAL ATRIAL FIBRILLATION (H): Primary | ICD-10-CM

## 2023-07-20 PROCEDURE — 99213 OFFICE O/P EST LOW 20 MIN: CPT | Performed by: INTERNAL MEDICINE

## 2023-07-20 RX ORDER — ROSUVASTATIN CALCIUM 20 MG/1
20 TABLET, COATED ORAL DAILY
Qty: 90 TABLET | Refills: 3 | Status: SHIPPED | OUTPATIENT
Start: 2023-07-20 | End: 2023-10-11 | Stop reason: DRUGHIGH

## 2023-07-20 NOTE — LETTER
7/20/2023    Isael Pike MD  [address]  [facility] [address]    RE: Staci Valerarafita       Dear Colleague,     I had the pleasure of seeing Staci Lopez in the [facility].  HPI and Plan:     I had the pleasure of seeing Ms. Lopez in follow up at the [facility] today.  She is a very pleasant 72-year-old female who I saw in 12/2022 for a new diagnosis of atrial fibrillation.    The patient is a generally healthy individual with a past medical history significant for moderate coronary artery calcification on a CT coronary artery calcium scoring study in 2020, as well as polymyalgia rheumatica and treated hypertension and dyslipidemia.  She was recently seen by her primary care physician and an irregular heart rhythm was noted.  A subsequent EKG confirmed the presence of atrial fibrillation with a ventricular rate of 95 beats per minute.  Cardiology consultation was requested.    She was initiated on Eliquis for thromboembolic prophylaxis and a Zio patch monitor was subsequently performed to evaluate for her atrial fibrillation burden.  This demonstrated no evidence of atrial fibrillation with 68 runs of SVT.  Given that she was completely asymptomatic, we recommended continuation of anticoagulation and withholding any rate/rhythm control therapy given no evidence of atrial fibrillation and her asymptomatic status.    An echocardiogram had earlier demonstrated normal left ventricular size and systolic function with borderline dilatation of the left atrium.  This was performed on 12/20/2022.    She did undergo Zio patch monitoring in December 2022 which demonstrated sinus rhythm with 68 runs of SVT, the longest being 49 seconds.  Rare PACs and PVCs were noted.    Today she feels well overall from a cardiovascular standpoint.  She specifically denies any chest discomfort, palpitations, syncope or presyncope.  She denies any PND orthopnea.  She walks  2 to 5 miles most days of the week without any limitations.  She has been taking all her medications as prescribed including her Eliquis.    She does bring up occasional shoulder/back discomfort.  She does have polymyalgia rheumatica and attributes her symptoms to this.  She has not noted a clear exertional component to the symptoms.    PHYSICAL EXAMINATION:  Dictated below.    She has undergone stress echocardiography in 2009, which was negative for ischemia.    Recent labs were reviewed.  They include a lipid panel from 12/01/2022, which demonstrated total cholesterol 213, HDL of 97, LDL of 97 and triglycerides 114.    Her TSH on the same day was 2.8.    IMPRESSION/PLAN:     Paroxysmal atrial fibrillation.  Moderate coronary artery calcification.  Dyslipidemia.  LDL suboptimal on atorvastatin 20 mg daily.      Ms. Lopez is doing well overall from a cardiovascular standpoint.  There is no evidence of angina, congestive heart failure or symptomatic recurrent atrial fibrillation.  We did discuss scheduling a stress echocardiogram to evaluate her symptoms of shoulder/back discomfort further, and she will consider this and inform us if she wishes to proceed.    From a risk factor modification standpoint, I recommended that we discontinue atorvastatin and start rosuvastatin 20 mg daily for more optimal lipid lowering.  A follow-up lipid panel has been ordered which will be performed in approximately 2 to 3 months.    It was a pleasure seeing her in follow-up today.      CURRENT MEDICATIONS:  Current Outpatient Medications   Medication Sig Dispense Refill    alendronate (FOSAMAX) 70 MG tablet Take 1 tablet (70 mg) by mouth every 7 days (Patient not taking: Reported on 12/1/2022) 12 tablet 3    amLODIPine (NORVASC) 10 MG tablet TAKE 1 TABLET BY MOUTH DAILY 90 tablet 1    apixaban ANTICOAGULANT (ELIQUIS ANTICOAGULANT) 5 MG tablet Take 1 tablet (5 mg) by mouth 2 times daily 180 tablet 3    atorvastatin (LIPITOR) 20 MG  tablet TAKE 1 TABLET(20 MG) BY MOUTH DAILY 90 tablet 1    Calcium Carbonate-Vitamin D (SM CALCIUM 500/VITAMIN D3 PO) Take 1 tablet by mouth daily.      Cholecalciferol (VITAMIN D) 1000 UNITS capsule Take 1 capsule by mouth daily 100 capsule 12    Glucosamine-Chondroit-Vit C-Mn (GLUCOSAMINE CHONDROITIN 1500 COMPLEX) CAPS Take 1 capsule by mouth daily.      Multiple Vitamin (MULTI-VITAMIN) per tablet Take 1 tablet by mouth daily. 100 tablet 3    naproxen sodium (ANAPROX) 220 MG tablet Take 220 mg by mouth as needed (Patient not taking: Reported on 4/6/2023)      predniSONE (DELTASONE) 1 MG tablet Take 5 tablets (5 mg) by mouth daily 360 tablet 1    predniSONE (DELTASONE) 5 MG tablet Take 1 tablet (5 mg) by mouth daily Use with 1 mg tablets to gradually wean down on total dose as directed. 90 tablet 0    vitamin C (ASCORBIC ACID) 1000 MG TABS Take 1,000 mg by mouth daily         ALLERGIES     Allergies   Allergen Reactions    Ace Inhibitors      Cough      Sulfamethoxazole-Trimethoprim Rash     Rash         PAST MEDICAL HISTORY:  Past Medical History:   Diagnosis Date    ACP (advance care planning)     will bring form in 4/1/13    Colon adenoma 01/01/2009    Elevated coronary artery calcium score     total Agatston 2510=862 / 75-90th percentile    Hyperlipidemia LDL goal < 130     Hypertension goal BP (blood pressure) < 140/80     Osteopenia     DEXA -2.0 in July 2013    Paroxysmal atrial fibrillation (H)     PMR (polymyalgia rheumatica) (H)     off pred at end of 2015- Dr Peter    Pure hypercholesterolemia        PAST SURGICAL HISTORY:  Past Surgical History:   Procedure Laterality Date    BREAST BIOPSY, RT/LT      x 2 - benign       FAMILY HISTORY:  Family History   Problem Relation Age of Onset    Arthritis Mother         balance not great    Other - See Comments Father         fall chest full on blood after fall in the hospital 6/5/18    Cancer Maternal Grandmother     Parkinsonism Maternal Grandfather      Neurologic Disorder Paternal Grandmother         bipolar    High cholesterol Brother     High cholesterol Brother     High cholesterol Brother     Hyperlipidemia Sister     Breast Cancer Sister 50    No Known Problems Daughter        SOCIAL HISTORY:  Social History     Socioeconomic History    Marital status: Single   Tobacco Use    Smoking status: Former     Types: Cigarettes     Quit date: 2010     Years since quittin.7    Smokeless tobacco: Never   Substance and Sexual Activity    Alcohol use: Yes     Alcohol/week: 7.0 standard drinks of alcohol     Types: 7 Glasses of wine per week     Comment: social    Drug use: No    Sexual activity: Not Currently     Partners: Male       Review of Systems:  Skin:          Eyes:         ENT:         Respiratory:          Cardiovascular:         Gastroenterology:        Genitourinary:         Musculoskeletal:         Neurologic:         Psychiatric:         Heme/Lymph/Imm:         Endocrine:           Physical Exam:  Vitals: There were no vitals taken for this visit.    Constitutional:           Skin:             Head:           Eyes:           Lymph:      ENT:           Neck:           Respiratory:            Cardiac:                                                           GI:           Extremities and Muscular Skeletal:                 Neurological:           Psych:           CC  Violeta Gan, CNP  [address] [place] [address]    Thank you for allowing me to participate in the care of your patient.      Sincerely,     Caroline Mcknight MD     [facility]

## 2023-07-28 DIAGNOSIS — I10 HYPERTENSION GOAL BP (BLOOD PRESSURE) < 140/80: ICD-10-CM

## 2023-07-28 RX ORDER — AMLODIPINE BESYLATE 10 MG/1
10 TABLET ORAL DAILY
Qty: 90 TABLET | Refills: 3 | Status: SHIPPED | OUTPATIENT
Start: 2023-07-28 | End: 2024-07-10

## 2023-07-28 NOTE — TELEPHONE ENCOUNTER
Amlodipine. LOV 4/06/23.    Hypertension goal BP (blood pressure) < 140/80   BP Readings from Last 3 Encounters:   07/20/23 126/75   04/06/23 (!) 154/71   01/05/23 137/75

## 2023-07-28 NOTE — TELEPHONE ENCOUNTER
PT IS OUT NEED TODAY CAN WE GET TO ONCALL    AMLODIPINE 10MG     PT STATED THAT CALLED US MONDAY TO OK REFILL  I DONT SEE THAT IN COMPUTER

## 2023-08-14 ENCOUNTER — TELEPHONE (OUTPATIENT)
Dept: CARDIOLOGY | Facility: CLINIC | Age: 73
End: 2023-08-14
Payer: COMMERCIAL

## 2023-08-14 NOTE — TELEPHONE ENCOUNTER
Routing call to PA team-     We received a fax form from [employer] needing to be filled out for a prior auth for Eliquis. Is there a fax number I could send this to?    Rabia Mckeon RN

## 2023-08-18 NOTE — TELEPHONE ENCOUNTER
Central Prior Authorization Team   Phone: [phone]    PA Initiation    Medication: Eliquis 5MG tablets    Insurance Company: [employer] Non-Specialty PA's - Phone [phone] Fax [phone]  Pharmacy Filling the Rx: [id number] - [place], [place] - [address] [facility]  Filling Pharmacy Phone: [phone]  Filling Pharmacy Fax:    Start Date: 8/18/2023

## 2023-08-18 NOTE — TELEPHONE ENCOUNTER
Prior Authorization Not Needed per Insurance    Medication: Eliquis 5MG tablets    Insurance Company: [employer] Non-Specialty PA's - Phone [phone] Fax [phone]  Expected CoPay:      Pharmacy Filling the Rx: [id number] - [place], [place] - [address] [facility]  Pharmacy Notified: Yes  Patient Notified: Yes    No PA needed, medication is covered under plan.  $47 copay for 30 days supply

## 2023-09-01 DIAGNOSIS — M35.3 PMR (POLYMYALGIA RHEUMATICA) (H): ICD-10-CM

## 2023-09-01 LAB — ERYTHROCYTE [SEDIMENTATION RATE] IN BLOOD: 21 MM/HR (ref 0–20)

## 2023-09-01 PROCEDURE — 36415 COLL VENOUS BLD VENIPUNCTURE: CPT | Performed by: FAMILY MEDICINE

## 2023-09-01 PROCEDURE — 85651 RBC SED RATE NONAUTOMATED: CPT | Performed by: FAMILY MEDICINE

## 2023-09-25 DIAGNOSIS — M35.3 PMR (POLYMYALGIA RHEUMATICA) (H): ICD-10-CM

## 2023-09-25 LAB — ERYTHROCYTE [SEDIMENTATION RATE] IN BLOOD: 29 MM/HR (ref 0–20)

## 2023-09-25 PROCEDURE — 85651 RBC SED RATE NONAUTOMATED: CPT | Performed by: FAMILY MEDICINE

## 2023-10-03 ENCOUNTER — TELEPHONE (OUTPATIENT)
Dept: FAMILY MEDICINE | Facility: CLINIC | Age: 73
End: 2023-10-03

## 2023-10-03 DIAGNOSIS — M35.3 PMR (POLYMYALGIA RHEUMATICA) (H): ICD-10-CM

## 2023-10-03 RX ORDER — PREDNISONE 5 MG/1
TABLET ORAL
Qty: 90 TABLET | Refills: 0 | Status: SHIPPED | OUTPATIENT
Start: 2023-10-03 | End: 2023-10-31

## 2023-10-03 NOTE — TELEPHONE ENCOUNTER
Patient calls requesting refill on prednisone 5mg tabs for PMR. Reports had weaned down to 6mg QD for about 3 weeks from 7.5mg QD but then PMR aches/pain relapsed around 9/25 so came in for ESR which was elevated for her at 29 so on 9/26 patient decided to increase her prednisone dose to initial PMR dose of 15mg QD for 21 days. Symptoms abated within 2 days and feeling great currently.   Plan: Dr. Pike recommends decrease prednisone to 10mg QD x 7 days, then to 7.5mg every day. Patient agrees and prednisone 5mg rx sent to pharmacy. Patient will call for appt if symptoms recur.   Lorene Quiroga RN

## 2023-10-10 ENCOUNTER — LAB (OUTPATIENT)
Dept: LAB | Facility: CLINIC | Age: 73
End: 2023-10-10
Payer: COMMERCIAL

## 2023-10-10 ENCOUNTER — TELEPHONE (OUTPATIENT)
Dept: CARDIOLOGY | Facility: CLINIC | Age: 73
End: 2023-10-10

## 2023-10-10 DIAGNOSIS — E78.00 PURE HYPERCHOLESTEROLEMIA: Primary | ICD-10-CM

## 2023-10-10 DIAGNOSIS — E78.5 DYSLIPIDEMIA (HIGH LDL; LOW HDL): ICD-10-CM

## 2023-10-10 LAB
CHOLEST SERPL-MCNC: 219 MG/DL
HDLC SERPL-MCNC: 115 MG/DL
LDLC SERPL CALC-MCNC: 92 MG/DL
NONHDLC SERPL-MCNC: 104 MG/DL
TRIGL SERPL-MCNC: 62 MG/DL

## 2023-10-10 PROCEDURE — 36415 COLL VENOUS BLD VENIPUNCTURE: CPT | Performed by: INTERNAL MEDICINE

## 2023-10-10 PROCEDURE — 80061 LIPID PANEL: CPT | Performed by: INTERNAL MEDICINE

## 2023-10-10 NOTE — TELEPHONE ENCOUNTER
Lipid panel done as below, ordered by Dr Mcknight for 3mo f/up after changing atorvastatin 20mg daily to rosuvastatin 20mg at visit 7/20/23. Previous LDL = 97. Hx dyslipidemia. Routed to provider to review.       Component      Latest Ref Rng 10/10/2023  9:11 AM   Cholesterol      <200 mg/dL 219 (H)    Triglycerides      <150 mg/dL 62    HDL Cholesterol      >=50 mg/dL 115    LDL Cholesterol Calculated      <=100 mg/dL 92    Non HDL Cholesterol      <130 mg/dL 104       Legend:  (H) High

## 2023-10-11 RX ORDER — ROSUVASTATIN CALCIUM 40 MG/1
40 TABLET, COATED ORAL DAILY
Qty: 90 TABLET | Refills: 3 | Status: SHIPPED | OUTPATIENT
Start: 2023-10-11 | End: 2024-07-22

## 2023-10-11 NOTE — TELEPHONE ENCOUNTER
Spoke with patient to review lipid panel results and Dr. Mcknight's recommendation to change to crestor 40mg daily. Patient agreed to give this a try. Rx escripted. Order placed for flp/alt in 3 months.

## 2023-11-08 DIAGNOSIS — M35.3 PMR (POLYMYALGIA RHEUMATICA) (H): ICD-10-CM

## 2023-11-08 LAB — ERYTHROCYTE [SEDIMENTATION RATE] IN BLOOD: 20 MM/HR (ref 0–20)

## 2023-11-08 PROCEDURE — 36415 COLL VENOUS BLD VENIPUNCTURE: CPT | Performed by: FAMILY MEDICINE

## 2023-11-08 PROCEDURE — G0008 ADMIN INFLUENZA VIRUS VAC: HCPCS | Performed by: FAMILY MEDICINE

## 2023-11-08 PROCEDURE — 85651 RBC SED RATE NONAUTOMATED: CPT | Performed by: FAMILY MEDICINE

## 2023-11-08 PROCEDURE — 90694 VACC AIIV4 NO PRSRV 0.5ML IM: CPT | Performed by: FAMILY MEDICINE

## 2023-11-15 DIAGNOSIS — I48.91 ATRIAL FIBRILLATION WITH NORMAL VENTRICULAR RATE (H): ICD-10-CM

## 2023-12-01 DIAGNOSIS — M35.3 PMR (POLYMYALGIA RHEUMATICA) (H): ICD-10-CM

## 2023-12-01 LAB — ERYTHROCYTE [SEDIMENTATION RATE] IN BLOOD: 32 MM/HR (ref 0–20)

## 2023-12-01 PROCEDURE — 36415 COLL VENOUS BLD VENIPUNCTURE: CPT | Performed by: FAMILY MEDICINE

## 2023-12-01 PROCEDURE — 85651 RBC SED RATE NONAUTOMATED: CPT | Performed by: FAMILY MEDICINE

## 2023-12-05 ENCOUNTER — TELEPHONE (OUTPATIENT)
Dept: FAMILY MEDICINE | Facility: CLINIC | Age: 73
End: 2023-12-05

## 2023-12-05 DIAGNOSIS — M35.3 PMR (POLYMYALGIA RHEUMATICA) (H): Primary | ICD-10-CM

## 2023-12-05 RX ORDER — PREDNISONE 5 MG/1
TABLET ORAL
Qty: 90 TABLET | Refills: 0 | Status: SHIPPED | OUTPATIENT
Start: 2023-12-05 | End: 2024-09-26

## 2023-12-14 ENCOUNTER — PATIENT OUTREACH (OUTPATIENT)
Dept: GASTROENTEROLOGY | Facility: CLINIC | Age: 73
End: 2023-12-14
Payer: COMMERCIAL

## 2024-01-12 ENCOUNTER — HOSPITAL ENCOUNTER (OUTPATIENT)
Dept: MAMMOGRAPHY | Facility: CLINIC | Age: 74
Discharge: HOME | End: 2024-01-12
Attending: FAMILY MEDICINE | Admitting: FAMILY MEDICINE
Payer: COMMERCIAL

## 2024-01-12 DIAGNOSIS — Z12.31 VISIT FOR SCREENING MAMMOGRAM: ICD-10-CM

## 2024-01-12 PROCEDURE — 77063 BREAST TOMOSYNTHESIS BI: CPT

## 2024-01-13 ENCOUNTER — HEALTH MAINTENANCE LETTER (OUTPATIENT)
Age: 74
End: 2024-01-13

## 2024-02-05 ENCOUNTER — OFFICE VISIT (OUTPATIENT)
Dept: FAMILY MEDICINE | Facility: CLINIC | Age: 74
End: 2024-02-05

## 2024-02-05 VITALS
HEART RATE: 80 BPM | HEIGHT: 66 IN | BODY MASS INDEX: 22.98 KG/M2 | DIASTOLIC BLOOD PRESSURE: 61 MMHG | SYSTOLIC BLOOD PRESSURE: 141 MMHG | OXYGEN SATURATION: 98 % | WEIGHT: 143 LBS

## 2024-02-05 DIAGNOSIS — I48.0 PAROXYSMAL ATRIAL FIBRILLATION (H): ICD-10-CM

## 2024-02-05 DIAGNOSIS — D68.69 SECONDARY HYPERCOAGULABLE STATE (H): Primary | ICD-10-CM

## 2024-02-05 DIAGNOSIS — M35.3 PMR (POLYMYALGIA RHEUMATICA) (H): ICD-10-CM

## 2024-02-05 LAB — ERYTHROCYTE [SEDIMENTATION RATE] IN BLOOD: 16 MM/HR (ref 0–20)

## 2024-02-05 PROCEDURE — 99214 OFFICE O/P EST MOD 30 MIN: CPT | Performed by: FAMILY MEDICINE

## 2024-02-05 PROCEDURE — 85651 RBC SED RATE NONAUTOMATED: CPT | Performed by: FAMILY MEDICINE

## 2024-02-05 PROCEDURE — 36415 COLL VENOUS BLD VENIPUNCTURE: CPT | Performed by: FAMILY MEDICINE

## 2024-02-05 RX ORDER — PREDNISONE 1 MG/1
1 TABLET ORAL DAILY
Qty: 270 TABLET | Refills: 3 | Status: SHIPPED | OUTPATIENT
Start: 2024-02-05 | End: 2024-02-07

## 2024-02-05 RX ORDER — PREDNISONE 5 MG/1
5 TABLET ORAL DAILY
Qty: 90 TABLET | Refills: 3 | Status: SHIPPED | OUTPATIENT
Start: 2024-02-05

## 2024-02-05 NOTE — PROGRESS NOTES
PMR   Currently on 8 mg daily.  Has weaned down to around this range and then spikes again.  This episode has been since 2021        Assessment/Plan:  Staci was seen today for recheck and health maintenance.    Diagnoses and all orders for this visit:    Paroxysmal atrial fibrillation (H) resulting in Secondary hypercoagulable state (H24)  [id number] Score: 3 points, which represents a 3.2% annual risk of major embolic event, without anti-coagulation or an LAAO device.     PMR (polymyalgia rheumatica) (H24)  Continue to weand  -     Sed Rate Leslie (RMG)  -     VENOUS COLLECTION  -     predniSONE (DELTASONE) 1 MG tablet; Take 1 tablet (1 mg) by mouth daily  -     predniSONE (DELTASONE) 5 MG tablet; Take 1 tablet (5 mg) by mouth daily      -     VENOUS COLLECTION      Isael Pike MD   [facility]  [phone]

## 2024-02-07 RX ORDER — PREDNISONE 1 MG/1
3 TABLET ORAL DAILY
Qty: 270 TABLET | Refills: 3 | Status: SHIPPED | OUTPATIENT
Start: 2024-02-07

## 2024-03-22 ENCOUNTER — TELEPHONE (OUTPATIENT)
Dept: CARDIOLOGY | Facility: CLINIC | Age: 74
End: 2024-03-22
Payer: COMMERCIAL

## 2024-03-22 NOTE — TELEPHONE ENCOUNTER
Received a call from [facility] ([phone]). They are requesting a 2-day hold of the eliquis for a colonoscopy on 4/12/2024.    Patient has a history of paroxysmal atrial fibrillation.  She did undergo Zio patch monitoring in December 2022 which demonstrated sinus rhythm with 68 runs of SVT, the longest being 49 seconds.  Rare PACs and PVCs were noted.    Will message Dr. Mcknight to review

## 2024-03-25 NOTE — TELEPHONE ENCOUNTER
Left a message on the [employer] voice mail line with approval update on the 2-day eliquis hold prior to patient's colonoscopy.

## 2024-03-28 DIAGNOSIS — M35.3 PMR (POLYMYALGIA RHEUMATICA) (H): ICD-10-CM

## 2024-03-28 LAB — ERYTHROCYTE [SEDIMENTATION RATE] IN BLOOD: 19 MM/HR (ref 0–20)

## 2024-03-28 PROCEDURE — 36415 COLL VENOUS BLD VENIPUNCTURE: CPT

## 2024-03-28 PROCEDURE — 85651 RBC SED RATE NONAUTOMATED: CPT

## 2024-04-12 ENCOUNTER — TRANSCRIBE ORDERS (OUTPATIENT)
Dept: FAMILY MEDICINE | Facility: CLINIC | Age: 74
End: 2024-04-12

## 2024-04-12 ENCOUNTER — TRANSFERRED RECORDS (OUTPATIENT)
Dept: FAMILY MEDICINE | Facility: CLINIC | Age: 74
End: 2024-04-12

## 2024-05-28 DIAGNOSIS — M35.3 PMR (POLYMYALGIA RHEUMATICA) (H): Primary | ICD-10-CM

## 2024-05-28 LAB — ERYTHROCYTE [SEDIMENTATION RATE] IN BLOOD: 28 MM/HR (ref 0–20)

## 2024-05-28 PROCEDURE — 85651 RBC SED RATE NONAUTOMATED: CPT

## 2024-05-28 PROCEDURE — 36415 COLL VENOUS BLD VENIPUNCTURE: CPT

## 2024-07-09 DIAGNOSIS — I10 HYPERTENSION GOAL BP (BLOOD PRESSURE) < 140/80: ICD-10-CM

## 2024-07-09 NOTE — CONFIDENTIAL NOTE
Med: AMLODIPINE    LOV (related): 12/1/22 - CPX    Last Lab: 12/1/22      Due for F/U around: OVERDUE FOR CPX    Next Appt: NONE        BP Readings from Last 3 Encounters:   02/05/24 (!) 141/61   07/20/23 126/75   04/06/23 (!) 154/71       Last Comprehensive Metabolic Panel:  Lab Results   Component Value Date     12/01/2022    POTASSIUM 3.6 12/01/2022    CHLORIDE 102 12/01/2022    GLC 96 12/01/2022    BUN 18 12/01/2022    BUN 16 12/01/2022    CR 1.12 (H) 12/01/2022    LORENZO 9.5 12/01/2022

## 2024-07-10 RX ORDER — AMLODIPINE BESYLATE 10 MG/1
10 TABLET ORAL DAILY
Qty: 90 TABLET | Refills: 0 | Status: SHIPPED | OUTPATIENT
Start: 2024-07-10 | End: 2024-09-26

## 2024-07-19 ENCOUNTER — LAB (OUTPATIENT)
Dept: LAB | Facility: CLINIC | Age: 74
End: 2024-07-19
Payer: COMMERCIAL

## 2024-07-19 DIAGNOSIS — E78.00 PURE HYPERCHOLESTEROLEMIA: ICD-10-CM

## 2024-07-19 LAB
ALT SERPL W P-5'-P-CCNC: 26 U/L (ref 0–50)
CHOLEST SERPL-MCNC: 203 MG/DL
FASTING STATUS PATIENT QL REPORTED: YES
HDLC SERPL-MCNC: 105 MG/DL
LDLC SERPL CALC-MCNC: 84 MG/DL
NONHDLC SERPL-MCNC: 98 MG/DL
TRIGL SERPL-MCNC: 68 MG/DL

## 2024-07-19 PROCEDURE — 80061 LIPID PANEL: CPT | Performed by: INTERNAL MEDICINE

## 2024-07-19 PROCEDURE — 36415 COLL VENOUS BLD VENIPUNCTURE: CPT | Performed by: INTERNAL MEDICINE

## 2024-07-19 PROCEDURE — 84460 ALANINE AMINO (ALT) (SGPT): CPT | Performed by: INTERNAL MEDICINE

## 2024-07-22 ENCOUNTER — OFFICE VISIT (OUTPATIENT)
Dept: CARDIOLOGY | Facility: CLINIC | Age: 74
End: 2024-07-22
Payer: COMMERCIAL

## 2024-07-22 VITALS
OXYGEN SATURATION: 100 % | WEIGHT: 142 LBS | HEART RATE: 68 BPM | HEIGHT: 66 IN | BODY MASS INDEX: 22.82 KG/M2 | SYSTOLIC BLOOD PRESSURE: 138 MMHG | DIASTOLIC BLOOD PRESSURE: 66 MMHG

## 2024-07-22 DIAGNOSIS — E78.5 HYPERLIPIDEMIA LDL GOAL <100: ICD-10-CM

## 2024-07-22 DIAGNOSIS — I48.0 PAROXYSMAL ATRIAL FIBRILLATION (H): Primary | ICD-10-CM

## 2024-07-22 DIAGNOSIS — E78.00 PURE HYPERCHOLESTEROLEMIA: ICD-10-CM

## 2024-07-22 PROCEDURE — 99214 OFFICE O/P EST MOD 30 MIN: CPT | Performed by: NURSE PRACTITIONER

## 2024-07-22 RX ORDER — ROSUVASTATIN CALCIUM 40 MG/1
40 TABLET, COATED ORAL DAILY
Qty: 90 TABLET | Refills: 3 | Status: SHIPPED | OUTPATIENT
Start: 2024-07-22

## 2024-07-22 NOTE — LETTER
7/22/2024    Isael Pike MD  [address]  [facility] [address]    RE: Staci Lopez       Dear Colleague,     I had the pleasure of seeing Staci Lopez in the [facility].  Cardiology Clinic Progress Note  Staci Lopez MRN# [id number]   YOB: 1950 Age: 73 year old     Primary cardiologist: Dr. Mcknight     Reason for visit: annual follow-up     History of presenting illness:    Staci Lopez is a pleasant 73 year old patient with past medical history significant for newly diagnosed atrial fibrillation, moderate coronary artery calcification on a CT coronary artery calcium scoring study in 2020, polymyalgia rheumatica, hypertension, and dyslipidemia.    She was seen by Dr. Mcknight in December 2022 after being referred by her primary care physician for new atrial fibrillation noted on EKG. At the time of her visit, she remained asymptomatic.  Repeat EKG showed sinus rhythm. She was initiated on Eliquis 5 mg twice daily for stroke prophylaxis given her [id number]-[id number] score of 3. Echocardiogram around that time showed preserved LV function with an EF of 55 to 60%, borderline dilated left atrium, and no hemodynamically significant valvular disease.  Subsequent 14-day ziopatch showed her predominant underlying rhythm was sinus with a minimum heart rate 51 bpm and a max heart rate of 127 bpm, average heart rate 79 bpm.  No atrial fibrillation was noted.  She had 68 runs of SVT, longest lasting 49 seconds, rare PACs and PVCs.    The patient was last seen by Dr. Mcknight in July 2023 at which time she was overall doing well from a cardiovascular standpoint.  Lipid panel demonstrated LDL 97.  Her atorvastatin was switched to rosuvastatin for more optimal lipid-lowering regimen.  Follow-up lipid panel demonstrated LDL of 92, her rosuvastatin was subsequently increased to 40 mg daily.    Today the patient continues to do well.  She has no cardiopulmonary complaints.   She denies chest pain, shortness of breath, syncope recently, or palpitations.  No PND or orthopnea.  She remains active by walking 3 miles on a daily basis without any exertional symptoms.    Recent lipid panel was reviewed that demonstrates LDL of 82, , and HDL of 105.         Assessment and Plan:     ASSESSMENT:    Paroxysmal atrial fibrillation.  No recurrent atrial fibrillation on event monitor. She remains quite asymptomatic.  [id number] score of 3, on apixaban 5 mg twice daily for stroke prophylaxis.   SVT.  Noted on event monitor, longest lasting 49 seconds.  She was asymptomatic. Not currently on beta-blocker therapy.  Hypertension.  Well-controlled on amlodipine 10 mg daily.  Hyperlipidemia.  Repeat lipid panel with LDL of 82, which is improved with increased rosuvastatin.  Polymyalgia rheumatica.  On chronic prednisone.      PLAN:     Overall the patient is doing well from a cardiovascular standpoint, she has no exertional symptoms or signs of ischemia.    We discussed the initiation of ezetimibe to further optimize her cholesterol, she would like to hold off on this for now which I think is reasonable.  She will continue rosuvastatin 40 mg daily with repeat lipid panel in 1 year.  Follow-up in 1 year, sooner if needed.           Violeta Gan, TANIA, APRN, CNP  Page: [phone] (8a-5p M-F)    Orders this Visit:  Orders Placed This Encounter   Procedures    Lipid panel reflex to direct LDL Fasting    Follow-Up with Cardiology     Orders Placed This Encounter   Medications    apixaban ANTICOAGULANT (ELIQUIS ANTICOAGULANT) 5 MG tablet     Sig: Take 1 tablet (5 mg) by mouth 2 times daily     Dispense:  180 tablet     Refill:  3    rosuvastatin (CRESTOR) 40 MG tablet     Sig: Take 1 tablet (40 mg) by mouth daily     Dispense:  90 tablet     Refill:  3     Medications Discontinued During This Encounter   Medication Reason    rosuvastatin (CRESTOR) 40 MG tablet Reorder (No AVS)    apixaban  "ANTICOAGULANT (ELIQUIS ANTICOAGULANT) 5 MG tablet Reorder (No AVS)         Today's clinic visit entailed:  Review of the result(s) of each unique test - echo, labs, EKG  Ordering of each unique test  Prescription drug management  35 minutes spent on the date of the encounter doing chart review, review of test results, interpretation of tests, patient visit and documentation   Provider  Link to [facility] Help Grid     The level of medical decision making during this visit was of moderate complexity.           Review of Systems:     Review of Systems:  Skin:        Eyes:       ENT:       Respiratory:  Negative    Cardiovascular:  Negative    Gastroenterology:      Genitourinary:       Musculoskeletal:       Neurologic:       Psychiatric:       Heme/Lymph/Imm:  Positive for allergies  Endocrine:  Negative              Physical Exam:   Vitals: /66   Pulse 68   Ht 1.676 m (5' 6\")   Wt 64.4 kg (142 lb)   SpO2 100%   BMI 22.92 kg/m    Constitutional:  cooperative        Skin:  warm and dry to the touch        Head:  normocephalic        Eyes:  pupils equal and round        ENT:  not assessed this visit        Neck:  JVP normal        Chest:  normal breath sounds, clear to auscultation, normal A-P diameter, normal symmetry, normal respiratory excursion, no use of accessory muscles        Cardiac: regular rhythm;normal S1 and S2;no murmurs, gallops or rubs detected                  Abdomen:  abdomen soft        Vascular: pulses full and equal                                      Extremities and Back:  no edema        Neurological:  no gross motor deficits;affect appropriate             Medications:     Current Outpatient Medications   Medication Sig Dispense Refill    amLODIPine (NORVASC) 10 MG tablet TAKE 1 TABLET BY MOUTH DAILY 90 tablet 0    apixaban ANTICOAGULANT (ELIQUIS ANTICOAGULANT) 5 MG tablet Take 1 tablet (5 mg) by mouth 2 times daily 180 tablet 3    Calcium Carbonate-Vitamin D ( CALCIUM 500/VITAMIN D3 PO) " Take 1 tablet by mouth daily.      Cholecalciferol (VITAMIN D) 1000 UNITS capsule Take 1 capsule by mouth daily 100 capsule 12    Glucosamine-Chondroit-Vit C-Mn (GLUCOSAMINE CHONDROITIN 1500 COMPLEX) CAPS Take 1 capsule by mouth daily.      Multiple Vitamin (MULTI-VITAMIN) per tablet Take 1 tablet by mouth daily. 100 tablet 3    predniSONE (DELTASONE) 1 MG tablet Take 3 tablets (3 mg) by mouth daily . Take these in addition to one 5mg tablet for total of 8mg QD. Patient to wean down when able/instructed. 270 tablet 3    predniSONE (DELTASONE) 5 MG tablet Take 1 tablet (5 mg) by mouth daily 90 tablet 3    predniSONE (DELTASONE) 5 MG tablet Take 10mg QD for 30 days then take 9mg for 1 month then 8mg QD for 1 month. (Patient has rx for 1mg tabs also) 90 tablet 0    rosuvastatin (CRESTOR) 40 MG tablet Take 1 tablet (40 mg) by mouth daily 90 tablet 3    vitamin C (ASCORBIC ACID) 1000 MG TABS Take 1,000 mg by mouth daily      alendronate (FOSAMAX) 70 MG tablet Take 1 tablet (70 mg) by mouth every 7 days (Patient not taking: Reported on 7/20/2023) 12 tablet 3    naproxen sodium (ANAPROX) 220 MG tablet Take 220 mg by mouth as needed (Patient not taking: Reported on 4/6/2023)      predniSONE (DELTASONE) 1 MG tablet Take 5 tablets (5 mg) by mouth daily (Patient not taking: Reported on 2/5/2024) 360 tablet 1       Family History   Problem Relation Age of Onset    Arthritis Mother         balance not great    Other - See Comments Father         fall chest full on blood after fall in the hospital 6/5/18    Cancer Maternal Grandmother     Parkinsonism Maternal Grandfather     Neurologic Disorder Paternal Grandmother         bipolar    High cholesterol Brother     High cholesterol Brother     High cholesterol Brother     Hyperlipidemia Sister     Breast Cancer Sister 50    No Known Problems Daughter        Social History     Socioeconomic History    Marital status: Single     Spouse name: Not on file    Number of children: Not on  file    Years of education: Not on file    Highest education level: Not on file   Occupational History    Not on file   Tobacco Use    Smoking status: Former     Current packs/day: 0.00     Types: Cigarettes     Quit date: 2010     Years since quittin.7    Smokeless tobacco: Never   Substance and Sexual Activity    Alcohol use: Yes     Alcohol/week: 7.0 standard drinks of alcohol     Types: 7 Glasses of wine per week     Comment: social    Drug use: No    Sexual activity: Not Currently     Partners: Male   Other Topics Concern    Parent/sibling w/ CABG, MI or angioplasty before 65F 55M? Not Asked   Social History Narrative    Not on file     Social Determinants of Health     Financial Resource Strain: Not on file   Food Insecurity: Not on file   Transportation Needs: Not on file   Physical Activity: Not on file   Stress: Not on file   Social Connections: Not on file   Interpersonal Safety: Not on file   Housing Stability: Not on file            Past Medical History:     Past Medical History:   Diagnosis Date    ACP (advance care planning)     will bring form in 13    Colon adenoma 2009    Elevated coronary artery calcium score     total Agatston 7157=838 / 75-90th percentile    Hyperlipidemia LDL goal < 130     Hypertension goal BP (blood pressure) < 140/80     Osteopenia     DEXA -2.0 in 2013    Paroxysmal atrial fibrillation (H)     PMR (polymyalgia rheumatica) (H24)     off pred at end of - Dr Peter    Pure hypercholesterolemia               Past Surgical History:     Past Surgical History:   Procedure Laterality Date    BREAST BIOPSY, RT/LT      x 2 - benign              Allergies:   Ace inhibitors and Sulfamethoxazole-trimethoprim       Data:   All laboratory data reviewed:    Recent Labs   Lab Test 24  0803 10/10/23  0911 22  1130   LDL 84 92 97    115 97   NHDL 98 104  --    CHOL 203* 219* 213*   TRIG 68 62 114       Lab Results   Component Value Date    WBC  "7.0 12/01/2022    RBC 3.95 12/01/2022    HGB 12.7 12/01/2022    HCT 37.3 12/01/2022    MCV 94.4 12/01/2022    MCH 32.1 (A) 12/01/2022    MCHC 34.0 12/01/2022     12/01/2022       Lab Results   Component Value Date     12/01/2022    POTASSIUM 3.6 12/01/2022    CHLORIDE 102 12/01/2022    GLC 96 12/01/2022    BUN 18 12/01/2022    BUN 16 12/01/2022    CR 1.12 (H) 12/01/2022    LORENZO 9.5 12/01/2022      Lab Results   Component Value Date    AST 21 12/01/2022    ALT 26 07/19/2024    ALT 20 12/01/2022       No results found for: \"A1C\"    No results found for: \"INR\"        Thank you for allowing me to participate in the care of your patient.      Sincerely,     Violeta Gan, [facility]  cc:   Referred Self,   "

## 2024-07-22 NOTE — PATIENT INSTRUCTIONS
Today's Plan:     - Follow-up with us in 1 year, sooner if needed.      If you have questions or concerns please call my nurse team:  Marielle RN ([phone]) Blanche RN ([phone]) and Belle RN ([phone])    After Hours: [phone], option #2, ask for cardiologist on-call    Scheduling phone number: [phone]    Reminder: Please bring in all current medications, over the counter supplements and vitamin bottles to your next appointment.-    It was a pleasure seeing you today!     Violeta Gan, EDISON  7/22/2024    -

## 2024-07-22 NOTE — PROGRESS NOTES
Cardiology Clinic Progress Note  Staci Lopez MRN# [id number]   YOB: 1950 Age: 73 year old     Primary cardiologist: Dr. Mcknight     Reason for visit: annual follow-up     History of presenting illness:    Staci Lopez is a pleasant 73 year old patient with past medical history significant for newly diagnosed atrial fibrillation, moderate coronary artery calcification on a CT coronary artery calcium scoring study in 2020, polymyalgia rheumatica, hypertension, and dyslipidemia.    She was seen by Dr. Mcknight in December 2022 after being referred by her primary care physician for new atrial fibrillation noted on EKG. At the time of her visit, she remained asymptomatic.  Repeat EKG showed sinus rhythm. She was initiated on Eliquis 5 mg twice daily for stroke prophylaxis given her [id number]-HKYs score of 3. Echocardiogram around that time showed preserved LV function with an EF of 55 to 60%, borderline dilated left atrium, and no hemodynamically significant valvular disease.  Subsequent 14-day ziopatch showed her predominant underlying rhythm was sinus with a minimum heart rate 51 bpm and a max heart rate of 127 bpm, average heart rate 79 bpm.  No atrial fibrillation was noted.  She had 68 runs of SVT, longest lasting 49 seconds, rare PACs and PVCs.    The patient was last seen by Dr. Mcknight in July 2023 at which time she was overall doing well from a cardiovascular standpoint.  Lipid panel demonstrated LDL 97.  Her atorvastatin was switched to rosuvastatin for more optimal lipid-lowering regimen.  Follow-up lipid panel demonstrated LDL of 92, her rosuvastatin was subsequently increased to 40 mg daily.    Today the patient continues to do well.  She has no cardiopulmonary complaints.  She denies chest pain, shortness of breath, syncope recently, or palpitations.  No PND or orthopnea.  She remains active by walking 3 miles on a daily basis without any exertional symptoms.    Recent lipid panel was  reviewed that demonstrates LDL of 82, , and HDL of 105.         Assessment and Plan:     ASSESSMENT:    Paroxysmal atrial fibrillation.  No recurrent atrial fibrillation on event monitor. She remains quite asymptomatic.  [id number]-IFSv score of 3, on apixaban 5 mg twice daily for stroke prophylaxis.   SVT.  Noted on event monitor, longest lasting 49 seconds.  She was asymptomatic. Not currently on beta-blocker therapy.  Hypertension.  Well-controlled on amlodipine 10 mg daily.  Hyperlipidemia.  Repeat lipid panel with LDL of 82, which is improved with increased rosuvastatin.  Polymyalgia rheumatica.  On chronic prednisone.      PLAN:     Overall the patient is doing well from a cardiovascular standpoint, she has no exertional symptoms or signs of ischemia.    We discussed the initiation of ezetimibe to further optimize her cholesterol, she would like to hold off on this for now which I think is reasonable.  She will continue rosuvastatin 40 mg daily with repeat lipid panel in 1 year.  Follow-up in 1 year, sooner if needed.           Violeta Gan, TANIA, APRN, CNP  Page: [phone] (8a-5p M-F)    Orders this Visit:  Orders Placed This Encounter   Procedures    Lipid panel reflex to direct LDL Fasting    Follow-Up with Cardiology     Orders Placed This Encounter   Medications    apixaban ANTICOAGULANT (ELIQUIS ANTICOAGULANT) 5 MG tablet     Sig: Take 1 tablet (5 mg) by mouth 2 times daily     Dispense:  180 tablet     Refill:  3    rosuvastatin (CRESTOR) 40 MG tablet     Sig: Take 1 tablet (40 mg) by mouth daily     Dispense:  90 tablet     Refill:  3     Medications Discontinued During This Encounter   Medication Reason    rosuvastatin (CRESTOR) 40 MG tablet Reorder (No AVS)    apixaban ANTICOAGULANT (ELIQUIS ANTICOAGULANT) 5 MG tablet Reorder (No AVS)         Today's clinic visit entailed:  Review of the result(s) of each unique test - echo, labs, EKG  Ordering of each unique test  Prescription drug  "management  35 minutes spent on the date of the encounter doing chart review, review of test results, interpretation of tests, patient visit and documentation   Provider  Link to [facility] Help Grid     The level of medical decision making during this visit was of moderate complexity.           Review of Systems:     Review of Systems:  Skin:        Eyes:       ENT:       Respiratory:  Negative    Cardiovascular:  Negative    Gastroenterology:      Genitourinary:       Musculoskeletal:       Neurologic:       Psychiatric:       Heme/Lymph/Imm:  Positive for allergies  Endocrine:  Negative              Physical Exam:   Vitals: /66   Pulse 68   Ht 1.676 m (5' 6\")   Wt 64.4 kg (142 lb)   SpO2 100%   BMI 22.92 kg/m    Constitutional:  cooperative        Skin:  warm and dry to the touch        Head:  normocephalic        Eyes:  pupils equal and round        ENT:  not assessed this visit        Neck:  JVP normal        Chest:  normal breath sounds, clear to auscultation, normal A-P diameter, normal symmetry, normal respiratory excursion, no use of accessory muscles        Cardiac: regular rhythm;normal S1 and S2;no murmurs, gallops or rubs detected                  Abdomen:  abdomen soft        Vascular: pulses full and equal                                      Extremities and Back:  no edema        Neurological:  no gross motor deficits;affect appropriate             Medications:     Current Outpatient Medications   Medication Sig Dispense Refill    amLODIPine (NORVASC) 10 MG tablet TAKE 1 TABLET BY MOUTH DAILY 90 tablet 0    apixaban ANTICOAGULANT (ELIQUIS ANTICOAGULANT) 5 MG tablet Take 1 tablet (5 mg) by mouth 2 times daily 180 tablet 3    Calcium Carbonate-Vitamin D (SM CALCIUM 500/VITAMIN D3 PO) Take 1 tablet by mouth daily.      Cholecalciferol (VITAMIN D) 1000 UNITS capsule Take 1 capsule by mouth daily 100 capsule 12    Glucosamine-Chondroit-Vit C-Mn (GLUCOSAMINE CHONDROITIN 1500 COMPLEX) CAPS Take 1 " capsule by mouth daily.      Multiple Vitamin (MULTI-VITAMIN) per tablet Take 1 tablet by mouth daily. 100 tablet 3    predniSONE (DELTASONE) 1 MG tablet Take 3 tablets (3 mg) by mouth daily . Take these in addition to one 5mg tablet for total of 8mg QD. Patient to wean down when able/instructed. 270 tablet 3    predniSONE (DELTASONE) 5 MG tablet Take 1 tablet (5 mg) by mouth daily 90 tablet 3    predniSONE (DELTASONE) 5 MG tablet Take 10mg QD for 30 days then take 9mg for 1 month then 8mg QD for 1 month. (Patient has rx for 1mg tabs also) 90 tablet 0    rosuvastatin (CRESTOR) 40 MG tablet Take 1 tablet (40 mg) by mouth daily 90 tablet 3    vitamin C (ASCORBIC ACID) 1000 MG TABS Take 1,000 mg by mouth daily      alendronate (FOSAMAX) 70 MG tablet Take 1 tablet (70 mg) by mouth every 7 days (Patient not taking: Reported on 7/20/2023) 12 tablet 3    naproxen sodium (ANAPROX) 220 MG tablet Take 220 mg by mouth as needed (Patient not taking: Reported on 4/6/2023)      predniSONE (DELTASONE) 1 MG tablet Take 5 tablets (5 mg) by mouth daily (Patient not taking: Reported on 2/5/2024) 360 tablet 1       Family History   Problem Relation Age of Onset    Arthritis Mother         balance not great    Other - See Comments Father         fall chest full on blood after fall in the hospital 6/5/18    Cancer Maternal Grandmother     Parkinsonism Maternal Grandfather     Neurologic Disorder Paternal Grandmother         bipolar    High cholesterol Brother     High cholesterol Brother     High cholesterol Brother     Hyperlipidemia Sister     Breast Cancer Sister 50    No Known Problems Daughter        Social History     Socioeconomic History    Marital status: Single     Spouse name: Not on file    Number of children: Not on file    Years of education: Not on file    Highest education level: Not on file   Occupational History    Not on file   Tobacco Use    Smoking status: Former     Current packs/day: 0.00     Types: Cigarettes      Quit date: 2010     Years since quittin.7    Smokeless tobacco: Never   Substance and Sexual Activity    Alcohol use: Yes     Alcohol/week: 7.0 standard drinks of alcohol     Types: 7 Glasses of wine per week     Comment: social    Drug use: No    Sexual activity: Not Currently     Partners: Male   Other Topics Concern    Parent/sibling w/ CABG, MI or angioplasty before 65F 55M? Not Asked   Social History Narrative    Not on file     Social Determinants of Health     Financial Resource Strain: Not on file   Food Insecurity: Not on file   Transportation Needs: Not on file   Physical Activity: Not on file   Stress: Not on file   Social Connections: Not on file   Interpersonal Safety: Not on file   Housing Stability: Not on file            Past Medical History:     Past Medical History:   Diagnosis Date    ACP (advance care planning)     will bring form in 13    Colon adenoma 2009    Elevated coronary artery calcium score     total Agatston 3821=447 / 75-90th percentile    Hyperlipidemia LDL goal < 130     Hypertension goal BP (blood pressure) < 140/80     Osteopenia     DEXA -2.0 in 2013    Paroxysmal atrial fibrillation (H)     PMR (polymyalgia rheumatica) (H24)     off pred at end of - Dr Peter    Pure hypercholesterolemia               Past Surgical History:     Past Surgical History:   Procedure Laterality Date    BREAST BIOPSY, RT/LT      x 2 - benign              Allergies:   Ace inhibitors and Sulfamethoxazole-trimethoprim       Data:   All laboratory data reviewed:    Recent Labs   Lab Test 24  0803 10/10/23  0911 22  1130   LDL 84 92 97    115 97   NHDL 98 104  --    CHOL 203* 219* 213*   TRIG 68 62 114       Lab Results   Component Value Date    WBC 7.0 2022    RBC 3.95 2022    HGB 12.7 2022    HCT 37.3 2022    MCV 94.4 2022    MCH 32.1 (A) 2022    MCHC 34.0 2022     2022       Lab Results   Component  "Value Date     12/01/2022    POTASSIUM 3.6 12/01/2022    CHLORIDE 102 12/01/2022    GLC 96 12/01/2022    BUN 18 12/01/2022    BUN 16 12/01/2022    CR 1.12 (H) 12/01/2022    LORENZO 9.5 12/01/2022      Lab Results   Component Value Date    AST 21 12/01/2022    ALT 26 07/19/2024    ALT 20 12/01/2022       No results found for: \"A1C\"    No results found for: \"INR\"      "

## 2024-08-02 DIAGNOSIS — M35.3 PMR (POLYMYALGIA RHEUMATICA) (H): ICD-10-CM

## 2024-08-02 LAB — ERYTHROCYTE [SEDIMENTATION RATE] IN BLOOD: 16 MM/HR (ref 0–20)

## 2024-08-02 PROCEDURE — 85651 RBC SED RATE NONAUTOMATED: CPT

## 2024-09-23 ENCOUNTER — OFFICE VISIT (OUTPATIENT)
Dept: FAMILY MEDICINE | Facility: CLINIC | Age: 74
End: 2024-09-23

## 2024-09-23 VITALS
BODY MASS INDEX: 23.6 KG/M2 | OXYGEN SATURATION: 99 % | WEIGHT: 146.2 LBS | SYSTOLIC BLOOD PRESSURE: 131 MMHG | DIASTOLIC BLOOD PRESSURE: 90 MMHG | HEART RATE: 75 BPM

## 2024-09-23 DIAGNOSIS — I10 HYPERTENSION GOAL BP (BLOOD PRESSURE) < 140/80: ICD-10-CM

## 2024-09-23 DIAGNOSIS — Z23 NEEDS FLU SHOT: ICD-10-CM

## 2024-09-23 DIAGNOSIS — M35.3 PMR (POLYMYALGIA RHEUMATICA) (H): ICD-10-CM

## 2024-09-23 DIAGNOSIS — Z23 NEED FOR COVID-19 VACCINE: Primary | ICD-10-CM

## 2024-09-23 LAB
ANION GAP SERPL CALCULATED.3IONS-SCNC: 12 MMOL/L (ref 7–15)
BUN SERPL-MCNC: 22.2 MG/DL (ref 8–23)
CALCIUM SERPL-MCNC: 9.6 MG/DL (ref 8.8–10.4)
CHLORIDE SERPL-SCNC: 100 MMOL/L (ref 98–107)
CREAT SERPL-MCNC: 0.88 MG/DL (ref 0.51–0.95)
EGFRCR SERPLBLD CKD-EPI 2021: 69 ML/MIN/1.73M2
ERYTHROCYTE [SEDIMENTATION RATE] IN BLOOD: 20 MM/HR (ref 0–20)
FASTING STATUS PATIENT QL REPORTED: ABNORMAL
GLUCOSE SERPL-MCNC: 103 MG/DL (ref 70–99)
HCO3 SERPL-SCNC: 27 MMOL/L (ref 22–29)
POTASSIUM SERPL-SCNC: 3.9 MMOL/L (ref 3.4–5.3)
SODIUM SERPL-SCNC: 139 MMOL/L (ref 135–145)

## 2024-09-23 PROCEDURE — G0008 ADMIN INFLUENZA VIRUS VAC: HCPCS | Performed by: FAMILY MEDICINE

## 2024-09-23 PROCEDURE — 90480 ADMN SARSCOV2 VAC 1/ONLY CMP: CPT | Performed by: FAMILY MEDICINE

## 2024-09-23 PROCEDURE — 36415 COLL VENOUS BLD VENIPUNCTURE: CPT | Performed by: FAMILY MEDICINE

## 2024-09-23 PROCEDURE — 85651 RBC SED RATE NONAUTOMATED: CPT | Performed by: FAMILY MEDICINE

## 2024-09-23 PROCEDURE — 90653 IIV ADJUVANT VACCINE IM: CPT | Performed by: FAMILY MEDICINE

## 2024-09-23 PROCEDURE — 80048 BASIC METABOLIC PNL TOTAL CA: CPT | Mod: ORL | Performed by: FAMILY MEDICINE

## 2024-09-23 PROCEDURE — 99213 OFFICE O/P EST LOW 20 MIN: CPT | Mod: 25 | Performed by: FAMILY MEDICINE

## 2024-09-23 PROCEDURE — 91320 SARSCV2 VAC 30MCG TRS-SUC IM: CPT | Performed by: FAMILY MEDICINE

## 2024-09-23 NOTE — PROGRESS NOTES
Problem(s) Oriented visit        SUBJECTIVE:                                                    Staci Lopez is a 74 year old female who presents to clinic today for the following health issues :  Recheck Medication (Nonfasting/)    1. Need for COVID-19 vaccine  due    2. Needs flu shot    3. Check on the ESR         Problem list, Medication list, Allergies, and Medical/Social/Surgical histories reviewed in [facility] and updated as appropriate.   Additional history: as documented    ROS:  General and Resp. completed and negative except as noted above    Histories: reviewed    OBJECTIVE:                                                    BP (!) 131/90   Pulse 75   Wt 66.3 kg (146 lb 3.2 oz)   SpO2 99%   BMI 23.60 kg/m    Body mass index is 23.6 kg/m .   APPEARANCE: = Relaxed and in no distress  Resp effort = Calm regular breathing  Heart Rate, Rhythm, & sounds (no Murm)  = Regular rate and rhythm with no S3, S4, or murmur appreciated.     ASSESSMENT/PLAN:                                                    Quality gaps reviewed    Staci was seen today for recheck medication.    Diagnoses and all orders for this visit:    Need for COVID-19 vaccine    Needs flu shot    PMR (polymyalgia rheumatica) (H24)  Ready to go down to 7.5 mg on prednisone which has been the tough line in past  -     VENOUS COLLECTION  -     Sed Rate [place] ([facility])    Hypertension goal BP (blood pressure) < 140/80  Reviewed her current HTN management. Discussed our goal for her is a systolic pressure at or below 128 and diastolic pressure at or below 83.  We today managed her prescriptions with refills ensured to ensure availabilty of current medications.  Discussed the importance for aggressive management of HTN to prevent vascular complications later.  Recommended lower fat, lower carbohydrate, and lower sodium (<2000 mg)diet. Required intervals for follow up on HTN, lab studies reviewed.    Strongly recommened she follow her blood  pressures outside the clinic to ensure that BPs are remaining within guidelines,.  Instructed to contact me if the readings are not within guidelines on a regular basis so we can adjust treatment as needed.    -     VENOUS COLLECTION  -     Basic metabolic panel; Future  -     Basic metabolic panel    Other orders  -     COVID-19 12+ (PFIZER)  -     INFLUENZA VACCINE, TRIVALENT 65+ (FLUAD)        Regular exercise    Health maintenance items are reviewed in Epic and correct as of today:    Isael Pike MD  [facility]  Family Practice  [facility]  [phone]    For any issues my office # is [phone]    The longitudinal plan of care for the diagnosis(es)/condition(s) as documented were addressed during this visit. Due to the added complexity in care, I will continue to support Staci in the subsequent management and with ongoing continuity of care.

## 2024-09-26 DIAGNOSIS — M19.90 ARTHRITIS: ICD-10-CM

## 2024-09-26 DIAGNOSIS — I10 HYPERTENSION GOAL BP (BLOOD PRESSURE) < 140/80: ICD-10-CM

## 2024-09-26 DIAGNOSIS — M35.3 PMR (POLYMYALGIA RHEUMATICA) (H): ICD-10-CM

## 2024-09-26 NOTE — TELEPHONE ENCOUNTER
Med: Amlodipine  Prednisone 1 and 5 mg     LOV (related): 9/23/24    Last Lab: 9/23/24      Due for F/U around:  due for CPX      Next Appt: [place] [facility] [place]         BP Readings from Last 3 Encounters:   09/23/24 (!) 131/90   07/22/24 138/66   02/05/24 (!) 141/61       Last Comprehensive Metabolic Panel:  Lab Results   Component Value Date     09/23/2024    POTASSIUM 3.9 09/23/2024    CHLORIDE 100 09/23/2024    CO2 27 09/23/2024    ANIONGAP 12 09/23/2024     (H) 09/23/2024    BUN 22.2 09/23/2024    CR 0.88 09/23/2024    GFRESTIMATED 69 09/23/2024    LORENZO 9.6 09/23/2024

## 2024-09-29 RX ORDER — PREDNISONE 1 MG/1
5 TABLET ORAL DAILY
Qty: 360 TABLET | Refills: 1 | Status: SHIPPED | OUTPATIENT
Start: 2024-09-29

## 2024-09-29 RX ORDER — AMLODIPINE BESYLATE 10 MG/1
10 TABLET ORAL DAILY
Qty: 90 TABLET | Refills: 0 | Status: SHIPPED | OUTPATIENT
Start: 2024-09-29

## 2024-09-29 RX ORDER — PREDNISONE 5 MG/1
TABLET ORAL
Qty: 90 TABLET | Refills: 0 | Status: SHIPPED | OUTPATIENT
Start: 2024-09-29

## 2024-10-04 ENCOUNTER — TELEPHONE (OUTPATIENT)
Dept: CARDIOLOGY | Facility: CLINIC | Age: 74
End: 2024-10-04
Payer: COMMERCIAL

## 2024-10-04 NOTE — TELEPHONE ENCOUNTER
[facility] Call Center    Phone Message    May a detailed message be left on voicemail: yes     Reason for Call: Medication Refill Request    Has the patient contacted the pharmacy for the refill? Yes   Name of medication being requested: rosuvastatin (CRESTOR) 40 MG tablet   Provider who prescribed the medication: Dr. Mcknight  Pharmacy:   [id number] - [place], [place] - [address]     Date medication is needed: in a week  Action Taken: Message routed to:  [facility] cardio    Travel Screening: Not Applicable    Thank you!  [facility]       Date of Service:

## 2024-10-04 NOTE — TELEPHONE ENCOUNTER
Refills of rosuvastatin were sent 7/22/24 for 90 days and 3 refills to the requested pharmacy.  Called and spoke to Staci, who says she did not contact the pharmacy for a new refill, because the old bottle number says zero refills.  She will contact the pharmacy.

## 2024-11-07 NOTE — PATIENT INSTRUCTIONS
Patient Education   Preventive Care Advice   This is general advice given by our system to help you stay healthy. However, your care team may have specific advice just for you. Please talk to your care team about your preventive care needs.  Nutrition  Eat 5 or more servings of fruits and vegetables each day.  Try wheat bread, brown rice and whole grain pasta (instead of white bread, rice, and pasta).  Get enough calcium and vitamin D. Check the label on foods and aim for 100% of the RDA (recommended daily allowance).  Lifestyle  Exercise at least 150 minutes each week  (30 minutes a day, 5 days a week).  Do muscle strengthening activities 2 days a week. These help control your weight and prevent disease.  No smoking.  Wear sunscreen to prevent skin cancer.  Have a dental exam and cleaning every 6 months.  Yearly exams  See your health care team every year to talk about:  Any changes in your health.  Any medicines your care team has prescribed.  Preventive care, family planning, and ways to prevent chronic diseases.  Shots (vaccines)   HPV shots (up to age 26), if you've never had them before.  Hepatitis B shots (up to age 59), if you've never had them before.  COVID-19 shot: Get this shot when it's due.  Flu shot: Get a flu shot every year.  Tetanus shot: Get a tetanus shot every 10 years.  Pneumococcal, hepatitis A, and RSV shots: Ask your care team if you need these based on your risk.  Shingles shot (for age 50 and up)  General health tests  Diabetes screening:  Starting at age 35, Get screened for diabetes at least every 3 years.  If you are younger than age 35, ask your care team if you should be screened for diabetes.  Cholesterol test: At age 39, start having a cholesterol test every 5 years, or more often if advised.  Bone density scan (DEXA): At age 50, ask your care team if you should have this scan for osteoporosis (brittle bones).  Hepatitis C: Get tested at least once in your life.  STIs (sexually  transmitted infections)  Before age 24: Ask your care team if you should be screened for STIs.  After age 24: Get screened for STIs if you're at risk. You are at risk for STIs (including HIV) if:  You are sexually active with more than one person.  You don't use condoms every time.  You or a partner was diagnosed with a sexually transmitted infection.  If you are at risk for HIV, ask about PrEP medicine to prevent HIV.  Get tested for HIV at least once in your life, whether you are at risk for HIV or not.  Cancer screening tests  Cervical cancer screening: If you have a cervix, begin getting regular cervical cancer screening tests starting at age 21.  Breast cancer scan (mammogram): If you've ever had breasts, begin having regular mammograms starting at age 40. This is a scan to check for breast cancer.  Colon cancer screening: It is important to start screening for colon cancer at age 45.  Have a colonoscopy test every 10 years (or more often if you're at risk) Or, ask your provider about stool tests like a FIT test every year or Cologuard test every 3 years.  To learn more about your testing options, visit:   .  For help making a decision, visit:   https://bit.ly/dx23791.  Prostate cancer screening test: If you have a prostate, ask your care team if a prostate cancer screening test (PSA) at age 55 is right for you.  Lung cancer screening: If you are a current or former smoker ages 50 to 80, ask your care team if ongoing lung cancer screenings are right for you.  For informational purposes only. Not to replace the advice of your health care provider. Copyright   2023 [employer]. All rights reserved. Clinically reviewed by the [facility] Transitions Program. [employer] 508311 - REV 01/24.

## 2024-11-11 ENCOUNTER — OFFICE VISIT (OUTPATIENT)
Dept: FAMILY MEDICINE | Facility: CLINIC | Age: 74
End: 2024-11-11

## 2024-11-11 VITALS
RESPIRATION RATE: 16 BRPM | SYSTOLIC BLOOD PRESSURE: 128 MMHG | DIASTOLIC BLOOD PRESSURE: 84 MMHG | HEART RATE: 72 BPM | WEIGHT: 144.2 LBS | OXYGEN SATURATION: 99 % | BODY MASS INDEX: 23.18 KG/M2 | HEIGHT: 66 IN

## 2024-11-11 DIAGNOSIS — H61.23 BILATERAL IMPACTED CERUMEN: ICD-10-CM

## 2024-11-11 DIAGNOSIS — M19.90 ARTHRITIS: ICD-10-CM

## 2024-11-11 DIAGNOSIS — M81.0 AGE-RELATED OSTEOPOROSIS WITHOUT CURRENT PATHOLOGICAL FRACTURE: ICD-10-CM

## 2024-11-11 DIAGNOSIS — I10 HYPERTENSION GOAL BP (BLOOD PRESSURE) < 140/80: ICD-10-CM

## 2024-11-11 DIAGNOSIS — I48.0 PAROXYSMAL ATRIAL FIBRILLATION (H): ICD-10-CM

## 2024-11-11 DIAGNOSIS — E78.00 PURE HYPERCHOLESTEROLEMIA: ICD-10-CM

## 2024-11-11 DIAGNOSIS — M35.3 PMR (POLYMYALGIA RHEUMATICA) (H): ICD-10-CM

## 2024-11-11 DIAGNOSIS — Z00.00 ENCOUNTER FOR MEDICARE ANNUAL WELLNESS EXAM: Primary | ICD-10-CM

## 2024-11-11 LAB
ANION GAP SERPL CALCULATED.3IONS-SCNC: 11 MMOL/L (ref 7–15)
BUN SERPL-MCNC: 16.2 MG/DL (ref 8–23)
CALCIUM SERPL-MCNC: 9.5 MG/DL (ref 8.8–10.4)
CHLORIDE SERPL-SCNC: 103 MMOL/L (ref 98–107)
CHOLEST SERPL-MCNC: 204 MG/DL
CREAT SERPL-MCNC: 0.91 MG/DL (ref 0.51–0.95)
CRP SERPL-MCNC: 17.6 MG/L
EGFRCR SERPLBLD CKD-EPI 2021: 66 ML/MIN/1.73M2
ERYTHROCYTE [SEDIMENTATION RATE] IN BLOOD: 23 MM/HR (ref 0–20)
FASTING STATUS PATIENT QL REPORTED: YES
FASTING STATUS PATIENT QL REPORTED: YES
GLUCOSE SERPL-MCNC: 102 MG/DL (ref 70–99)
HCO3 SERPL-SCNC: 28 MMOL/L (ref 22–29)
HDLC SERPL-MCNC: 100 MG/DL
LDLC SERPL CALC-MCNC: 87 MG/DL
NONHDLC SERPL-MCNC: 104 MG/DL
POTASSIUM SERPL-SCNC: 4.3 MMOL/L (ref 3.4–5.3)
SODIUM SERPL-SCNC: 142 MMOL/L (ref 135–145)
TRIGL SERPL-MCNC: 87 MG/DL

## 2024-11-11 PROCEDURE — 99213 OFFICE O/P EST LOW 20 MIN: CPT | Mod: 25

## 2024-11-11 PROCEDURE — G0439 PPPS, SUBSEQ VISIT: HCPCS

## 2024-11-11 PROCEDURE — 69209 REMOVE IMPACTED EAR WAX UNI: CPT

## 2024-11-11 PROCEDURE — 86140 C-REACTIVE PROTEIN: CPT | Mod: ORL

## 2024-11-11 PROCEDURE — 80048 BASIC METABOLIC PNL TOTAL CA: CPT | Mod: ORL

## 2024-11-11 PROCEDURE — 36415 COLL VENOUS BLD VENIPUNCTURE: CPT

## 2024-11-11 PROCEDURE — 85651 RBC SED RATE NONAUTOMATED: CPT

## 2024-11-11 PROCEDURE — 80061 LIPID PANEL: CPT | Mod: ORL

## 2024-11-11 RX ORDER — PREDNISONE 1 MG/1
5 TABLET ORAL DAILY
Qty: 360 TABLET | Refills: 1 | Status: CANCELLED | OUTPATIENT
Start: 2024-11-11

## 2024-11-11 RX ORDER — PREDNISONE 5 MG/1
TABLET ORAL
Qty: 90 TABLET | Refills: 0 | Status: CANCELLED | OUTPATIENT
Start: 2024-11-11

## 2024-11-11 RX ORDER — AMLODIPINE BESYLATE 10 MG/1
10 TABLET ORAL DAILY
Qty: 90 TABLET | Refills: 3 | Status: SHIPPED | OUTPATIENT
Start: 2024-11-11

## 2024-11-11 RX ORDER — PREDNISONE 1 MG/1
3 TABLET ORAL DAILY
Qty: 270 TABLET | Refills: 3 | Status: CANCELLED | OUTPATIENT
Start: 2024-11-11

## 2024-11-11 RX ORDER — PREDNISONE 5 MG/1
5 TABLET ORAL DAILY
Qty: 90 TABLET | Refills: 3 | Status: CANCELLED | OUTPATIENT
Start: 2024-11-11

## 2024-11-11 SDOH — HEALTH STABILITY: PHYSICAL HEALTH: ON AVERAGE, HOW MANY MINUTES DO YOU ENGAGE IN EXERCISE AT THIS LEVEL?: 60 MIN

## 2024-11-11 SDOH — HEALTH STABILITY: PHYSICAL HEALTH: ON AVERAGE, HOW MANY DAYS PER WEEK DO YOU ENGAGE IN MODERATE TO STRENUOUS EXERCISE (LIKE A BRISK WALK)?: 5 DAYS

## 2024-11-11 ASSESSMENT — SOCIAL DETERMINANTS OF HEALTH (SDOH): HOW OFTEN DO YOU GET TOGETHER WITH FRIENDS OR RELATIVES?: TWICE A WEEK

## 2024-11-11 NOTE — PROGRESS NOTES
Preventive Care Visit  [facility]  JESSICA Mccollum CNP, Family Medicine  Nov 11, 2024      Assessment & Plan     Encounter for Medicare annual wellness exam  Age-appropriate preventative health maintenance along with diet, exercise and healthy weight discussed.     PMR (polymyalgia rheumatica) (H)  Arthritis  Currently taking prednisone 8 mg daily. Ongoing since 2021 weaning down then spikes again. Will check inflammatory markers including Sed rate and CRP. Stable/controlled. Continue current medication regimen and treatment plan.   - Sed Rate [place] ([facility])  - CRP inflammation  - CRP inflammation    Hypertension goal BP (blood pressure) < 140/80  BP at goal today. Taking Amlodipine 10 mg daily. Stable/controlled. Continue current medication regimen.  - VENOUS COLLECTION  - amLODIPine (NORVASC) 10 MG tablet  Dispense: 90 tablet; Refill: 3  - Basic metabolic panel  - Basic metabolic panel    Pure hypercholesterolemia  Taking Rosuvastatin 40 mg daily. Following with Cardiology. Stable/controlled. Continue current medication regimen.  - VENOUS COLLECTION  - Lipid Profile  - Lipid Profile    Paroxysmal atrial fibrillation (H)  Taking Eliquis 5 mg BID. Following with Cardiology. Stable/controlled. Continue current medication regimen.    Age-related osteoporosis without current pathological fracture  Taking Vit D and calcium. Due for Dexa scan next year. Reviewed non pharmacological interventions including strengthen exercises. Red flags that warrant emergent evaluation discussed. Patient agreeable to plan. All questions answered.     Bilateral impacted cerumen  Ear ear wax was successfully removed from bilateral ears. Patient tolerated procedure well. Discussed OTC debrox as needed. Follow up as needed for new or worsening symptoms. Patient agreeable to plan. All questions answered.    - RI REMOVAL IMPACTED CERUMEN IRRIGATION/LVG UNILAT      Patient has been advised of split billing requirements and  "indicates understanding: Yes        Counseling  Appropriate preventive services were addressed with this patient via screening, questionnaire, or discussion as appropriate for fall prevention, nutrition, physical activity, Tobacco-use cessation, social engagement, weight loss and cognition.  Checklist reviewing preventive services available has been given to the patient.  Reviewed patient's diet, addressing concerns and/or questions.   She is at risk for psychosocial distress and has been provided with information to reduce risk.       See Patient Instructions    No follow-ups on file.    Farshad Small is a 74 year old, presenting for the following:  Physical (Fasting) and Generalized Body Aches (Pt is experiencing aching all over her body as of yesterday and has worsened over night )            HPI  Arthritis/PMR: [id number]. Triggered by Covid. Had for over 3 years and weaning off prednisone. When down to 7.5 mg it triggers. Now at 8 mg daily. With flares goes up to 10 mg for 3 days and weans back down. Walks 5/7 days. Can't with flares. Body aches related related.    Osteoporosis: Last Dexa 10/2022. Never started Fosamax. Taking Vit D and calcium. Walking and gardening.     HTN: Taking Amlodipine 10 mg daily. Used to monitor but not anymore.     HLD: Rosuvastatin 40 mg daily. Cardiology upped it to 20 mg BID.    Afib: No one has found since (found last visit). Taking Eliquis 5 mg BID. Following with cardiologist.     Health maintenance   Mammo 1/2024, UTD  Dexa: Last in 2022, due next year  Colon: UTD, \"graduated\"      Health Care Directive  Patient does not have a Health Care Directive: Patient states has Advance Directive and will bring in a copy to clinic.      11/11/2024   General Health   How would you rate your overall physical health? Good   Feel stress (tense, anxious, or unable to sleep) Only a little      (!) STRESS CONCERN      11/11/2024   Nutrition   Diet: Regular (no restrictions)            " 11/11/2024   Exercise   Days per week of moderate/strenous exercise 5 days   Average minutes spent exercising at this level 60 min            11/11/2024   Social Factors   Frequency of gathering with friends or relatives Twice a week   Worry food won't last until get money to buy more No   Food not last or not have enough money for food? No   Do you have housing? (Housing is defined as stable permanent housing and does not include staying ouside in a car, in a tent, in an abandoned building, in an overnight shelter, or couch-surfing.) Yes   Are you worried about losing your housing? No   Lack of transportation? No   Unable to get utilities (heat,electricity)? No            11/11/2024   Fall Risk   Fallen 2 or more times in the past year? No    Trouble with walking or balance? No        Patient-reported          11/11/2024   Activities of Daily Living- Home Safety   Needs help with the following daily activites None of the above   Safety concerns in the home None of the above            11/11/2024   Dental   Dentist two times every year? Yes            11/11/2024   Hearing Screening   Hearing concerns? None of the above   Hearing    Left:  500Hz: Pass  1000Hz: Pass  2000Hz: Fail  4000Hz: Pass    Right:  500Hz: Pass  1000Hz: Pass  2000Hz: Pass  4000Hz: Fail           11/11/2024   Driving Risk Screening   Patient/family members have concerns about driving No            11/11/2024   General Alertness/Fatigue Screening   Have you been more tired than usual lately? No            11/11/2024   Urinary Incontinence Screening   Bothered by leaking urine in past 6 months No            11/11/2024   TB Screening   Were you born outside of the [place]? No              Today's PHQ-2 Score:       11/11/2024     9:59 AM   PHQ-2 ( 1999 Pfizer)   Q1: Little interest or pleasure in doing things 0   Q2: Feeling down, depressed or hopeless 0   PHQ-2 Score 0         11/11/2024   Substance Use   Alcohol more than 3/day or more than 7/wk No   Do  you have a current opioid prescription? No   How severe/bad is pain from 1 to 10? 9/10   Do you use any other substances recreationally? No        Social History     Tobacco Use    Smoking status: Former     Current packs/day: 0.00     Types: Cigarettes     Quit date: 2010     Years since quittin.0    Smokeless tobacco: Never   Substance Use Topics    Alcohol use: Yes     Alcohol/week: 7.0 standard drinks of alcohol     Types: 7 Glasses of wine per week     Comment: social    Drug use: No           2024   LAST FHS-7 RESULTS   1st degree relative breast or ovarian cancer Yes   Any relative bilateral breast cancer No   Any male have breast cancer No   Any ONE woman have BOTH breast AND ovarian cancer No   Any woman with breast cancer before 50yrs No   2 or more relatives with breast AND/OR ovarian cancer No   2 or more relatives with breast AND/OR bowel cancer No           Mammogram Screening - Mammogram every 1-2 years updated in Health Maintenance based on mutual decision making    ASCVD Risk   The ASCVD Risk score (Jacque PLUMMER, et al., 2019) failed to calculate for the following reasons:    The valid HDL cholesterol range is 20 to 100 mg/dL          Reviewed and updated as needed this visit by Provider   Tobacco  Allergies    Med Hx  Surg Hx  Fam Hx            Lab work is in process  Current providers sharing in care for this patient include:  Patient Care Team:  Isael Pike MD as PCP - General (Family Medicine)  Isael Pike MD as Assigned PCP  Violeta Gan CNP as Nurse Practitioner (Cardiovascular Disease)  Violeta Gan CNP as Assigned Heart and Vascular Provider    The following health maintenance items are reviewed in Epic and correct as of today:  Health Maintenance   Topic Date Due    DTAP/TDAP/TD IMMUNIZATION (1 - Tdap) 10/17/2006    MAMMO SCREENING  2025    LIPID  2025    BMP  2025    DEXA  10/11/2025    MEDICARE ANNUAL WELLNESS VISIT  " 11/11/2025    FALL RISK ASSESSMENT  11/11/2025    GLUCOSE  09/23/2027    ADVANCE CARE PLANNING  11/11/2029    HEPATITIS C SCREENING  Completed    PHQ-2 (once per calendar year)  Completed    INFLUENZA VACCINE  Completed    Pneumococcal Vaccine: 65+ Years  Completed    ZOSTER IMMUNIZATION  Completed    RSV VACCINE  Completed    COVID-19 Vaccine  Completed    HPV IMMUNIZATION  Aged Out    MENINGITIS IMMUNIZATION  Aged Out    RSV MONOCLONAL ANTIBODY  Aged Out    COLORECTAL CANCER SCREENING  Discontinued    LUNG CANCER SCREENING  Discontinued         Review of Systems  Constitutional, HEENT, cardiovascular, pulmonary, GI, , musculoskeletal, neuro, skin, endocrine and psych systems are negative, except as otherwise noted.     Objective    Exam  /84 (BP Location: Left arm)   Pulse 72   Resp 16   Ht 1.676 m (5' 6\")   Wt 65.4 kg (144 lb 3.2 oz)   SpO2 99%   BMI 23.27 kg/m     Estimated body mass index is 23.27 kg/m  as calculated from the following:    Height as of this encounter: 1.676 m (5' 6\").    Weight as of this encounter: 65.4 kg (144 lb 3.2 oz).    Physical Exam  GENERAL: alert and no distress  EYES: Eyes grossly normal to inspection, PERRL and conjunctivae and sclerae normal  HENT: ear canals and TM's normal, nose and mouth without ulcers or lesions  NECK: no adenopathy, no asymmetry, masses, or scars  RESP: lungs clear to auscultation - no rales, rhonchi or wheezes  BREAST: Deferred. Mammogram UTD  CV: regular rate and rhythm, normal S1 S2, no S3 or S4, no murmur, click or rub, no peripheral edema  ABDOMEN: soft, nontender, no hepatosplenomegaly, no masses and bowel sounds normal  MS: no gross musculoskeletal defects noted, no edema  SKIN: no suspicious lesions or rashes  NEURO: Normal strength and tone, mentation intact and speech normal  PSYCH: mentation appears normal, affect normal/bright         11/11/2024   Mini Cog   Clock Draw Score 2 Normal   3 Item Recall 3 objects recalled   Mini Cog " Total Score 5                 Signed Electronically by: JESSICA Mccollum CNP

## 2025-02-07 DIAGNOSIS — M35.3 PMR (POLYMYALGIA RHEUMATICA): ICD-10-CM

## 2025-02-07 NOTE — TELEPHONE ENCOUNTER
Med:  predniSONE (DELTASONE) 5 MG tablet     LOV (related): 11/11/24 AWV  Sed Rate   Date Value Ref Range Status   11/11/2024 23 (A) 0 - 20 mm/hr Final         Due for F/U around: none noted    Next Appt: none    Last note from LB    Your Sed Rate and CRP results were both elevated as we likely expected. Recommend continuing with the plan as we discussed at your visit and increasing your prednisone dose if you have not already done so and tapering back down.   
no

## 2025-02-10 RX ORDER — PREDNISONE 5 MG/1
5 TABLET ORAL DAILY
Qty: 90 TABLET | Refills: 3 | Status: SHIPPED | OUTPATIENT
Start: 2025-02-10

## 2025-03-22 ENCOUNTER — HEALTH MAINTENANCE LETTER (OUTPATIENT)
Age: 75
End: 2025-03-22

## 2025-04-07 ENCOUNTER — ANCILLARY ORDERS (OUTPATIENT)
Dept: MAMMOGRAPHY | Facility: CLINIC | Age: 75
End: 2025-04-07
Payer: COMMERCIAL

## 2025-04-07 DIAGNOSIS — Z12.31 VISIT FOR SCREENING MAMMOGRAM: Primary | ICD-10-CM

## 2025-04-08 ENCOUNTER — HOSPITAL ENCOUNTER (OUTPATIENT)
Dept: MAMMOGRAPHY | Facility: CLINIC | Age: 75
Discharge: HOME | End: 2025-04-08
Attending: FAMILY MEDICINE | Admitting: FAMILY MEDICINE
Payer: COMMERCIAL

## 2025-04-08 DIAGNOSIS — Z12.31 VISIT FOR SCREENING MAMMOGRAM: ICD-10-CM

## 2025-04-08 PROCEDURE — 77063 BREAST TOMOSYNTHESIS BI: CPT

## 2025-04-08 PROCEDURE — 77067 SCR MAMMO BI INCL CAD: CPT

## 2025-04-21 ENCOUNTER — OFFICE VISIT (OUTPATIENT)
Dept: FAMILY MEDICINE | Facility: CLINIC | Age: 75
End: 2025-04-21

## 2025-04-21 VITALS
BODY MASS INDEX: 23.82 KG/M2 | HEART RATE: 78 BPM | SYSTOLIC BLOOD PRESSURE: 115 MMHG | WEIGHT: 147.6 LBS | DIASTOLIC BLOOD PRESSURE: 42 MMHG | OXYGEN SATURATION: 99 %

## 2025-04-21 DIAGNOSIS — M35.3 PMR (POLYMYALGIA RHEUMATICA): Primary | ICD-10-CM

## 2025-04-21 DIAGNOSIS — M19.90 ARTHRITIS: ICD-10-CM

## 2025-04-21 LAB — ERYTHROCYTE [SEDIMENTATION RATE] IN BLOOD: 13 MM/HR (ref 0–20)

## 2025-04-21 PROCEDURE — G2211 COMPLEX E/M VISIT ADD ON: HCPCS

## 2025-04-21 PROCEDURE — 3074F SYST BP LT 130 MM HG: CPT

## 2025-04-21 PROCEDURE — 3078F DIAST BP <80 MM HG: CPT

## 2025-04-21 PROCEDURE — 85651 RBC SED RATE NONAUTOMATED: CPT

## 2025-04-21 PROCEDURE — 36415 COLL VENOUS BLD VENIPUNCTURE: CPT

## 2025-04-21 PROCEDURE — 99213 OFFICE O/P EST LOW 20 MIN: CPT

## 2025-04-21 NOTE — PROGRESS NOTES
Assessment & Plan     PMR (polymyalgia rheumatica)  Arthritis  Currently taking prednisone 8 mg daily. Diagnosed in 2014 and previously followed with rheumatology. Symptoms triggered again by Covid in 2021. Working on weaning down on prednisone. Will recheck Sed rate today. Plan to decrease Prednisone to 7 mg if normal. Plan to recheck sed rate every 2 months to continue to taper dose down. Red flags that warrant emergent evaluation discussed. Follow up in 2 months for recheck of sed rate or as needed for new or worsening symptoms or if symptoms fail to improve. Patient agreeable to plan. All questions answered.   - Sed Rate Westergren (RMG)  - VENOUS COLLECTION  - Sed Rate Westergren (RMG)              Follow-up  Return in about 2 months (around 6/21/2025) for Follow up.    Farshad Small is a 74 year old, presenting for the following health issues:  RECHECK (Would like to do a recheck on her SED rate)    History of Present Illness       Reason for visit:  Lab wok   She is taking medications regularly.        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 Will check periodically to see if it goes down. Went up to the 10 mg then weaned down to Prednisone 8 mg daily. Hasn't been able to get sed rate checked yet. Would like to check it every other month to see if she can wean down. Tendency to flare at 7.5. Normal will go down to 7. 2014 then resolved. Triggered again following Covid 4 years ago. First time went to rheumatologist in the past. Feels it most in shoulders and neck with flares. No symptoms currently.         Review of Systems  Constitutional, HEENT, cardiovascular, pulmonary, GI, , musculoskeletal, neuro, skin, endocrine and psych systems are negative, except as otherwise noted.      Objective    /42   Pulse 78   Wt 67 kg (147 lb 9.6 oz)   SpO2 99%   BMI 23.82 kg/m    Body mass index is 23.82 kg/m .  Physical Exam   GENERAL: alert  and no distress  EYES: Eyes grossly normal to inspection, PERRL and conjunctivae and sclerae normal  RESP: respirations even and unlabored   SKIN: no suspicious lesions or rashes  PSYCH: mentation appears normal, affect normal/bright            Signed Electronically by: JESSICA Mccollum CNP

## 2025-07-22 DIAGNOSIS — I48.0 PAROXYSMAL ATRIAL FIBRILLATION (H): Primary | ICD-10-CM

## 2025-07-22 DIAGNOSIS — E78.00 PURE HYPERCHOLESTEROLEMIA: ICD-10-CM

## 2025-07-22 RX ORDER — ROSUVASTATIN CALCIUM 40 MG/1
40 TABLET, COATED ORAL DAILY
Qty: 90 TABLET | Refills: 0 | Status: SHIPPED | OUTPATIENT
Start: 2025-07-22

## 2025-08-12 DIAGNOSIS — M35.3 PMR (POLYMYALGIA RHEUMATICA): ICD-10-CM

## 2025-08-12 DIAGNOSIS — M19.90 ARTHRITIS: ICD-10-CM

## 2025-08-12 LAB — ERYTHROCYTE [SEDIMENTATION RATE] IN BLOOD: 28 MM/HR (ref 0–20)

## 2025-08-12 PROCEDURE — 85651 RBC SED RATE NONAUTOMATED: CPT

## 2025-08-12 PROCEDURE — 36415 COLL VENOUS BLD VENIPUNCTURE: CPT

## 2025-09-03 ENCOUNTER — OFFICE VISIT (OUTPATIENT)
Age: 75
End: 2025-09-03

## 2025-09-03 VITALS
BODY MASS INDEX: 23.21 KG/M2 | SYSTOLIC BLOOD PRESSURE: 118 MMHG | DIASTOLIC BLOOD PRESSURE: 71 MMHG | WEIGHT: 143.8 LBS | OXYGEN SATURATION: 99 % | HEART RATE: 74 BPM

## 2025-09-03 DIAGNOSIS — R93.1 ELEVATED CORONARY ARTERY CALCIUM SCORE: ICD-10-CM

## 2025-09-03 DIAGNOSIS — Z01.818 PREOP GENERAL PHYSICAL EXAM: Primary | ICD-10-CM

## 2025-09-03 DIAGNOSIS — M19.90 ARTHRITIS: ICD-10-CM

## 2025-09-03 DIAGNOSIS — T38.0X5A IMMUNOSUPPRESSION DUE TO CHRONIC STEROID USE: ICD-10-CM

## 2025-09-03 DIAGNOSIS — M35.3 PMR (POLYMYALGIA RHEUMATICA): ICD-10-CM

## 2025-09-03 DIAGNOSIS — I47.20 VENTRICULAR TACHYCARDIA, UNSPECIFIED (H): ICD-10-CM

## 2025-09-03 DIAGNOSIS — I48.91 ATRIAL FIBRILLATION WITH NORMAL VENTRICULAR RATE (H): ICD-10-CM

## 2025-09-03 DIAGNOSIS — Z79.52 IMMUNOSUPPRESSION DUE TO CHRONIC STEROID USE: ICD-10-CM

## 2025-09-03 DIAGNOSIS — D84.821 IMMUNOSUPPRESSION DUE TO CHRONIC STEROID USE: ICD-10-CM

## 2025-09-03 DIAGNOSIS — H25.013 CORTICAL AGE-RELATED CATARACT OF BOTH EYES: ICD-10-CM

## 2025-09-03 DIAGNOSIS — I48.0 PAROXYSMAL ATRIAL FIBRILLATION (H): ICD-10-CM

## 2025-09-03 PROCEDURE — 91320 SARSCV2 VAC 30MCG TRS-SUC IM: CPT | Performed by: FAMILY MEDICINE

## 2025-09-03 PROCEDURE — 90653 IIV ADJUVANT VACCINE IM: CPT | Performed by: FAMILY MEDICINE

## 2025-09-03 PROCEDURE — 3074F SYST BP LT 130 MM HG: CPT | Performed by: FAMILY MEDICINE

## 2025-09-03 PROCEDURE — 90480 ADMN SARSCOV2 VAC 1/ONLY CMP: CPT | Performed by: FAMILY MEDICINE

## 2025-09-03 PROCEDURE — 99214 OFFICE O/P EST MOD 30 MIN: CPT | Mod: 25 | Performed by: FAMILY MEDICINE

## 2025-09-03 PROCEDURE — G0008 ADMIN INFLUENZA VIRUS VAC: HCPCS | Performed by: FAMILY MEDICINE

## 2025-09-03 PROCEDURE — 3078F DIAST BP <80 MM HG: CPT | Performed by: FAMILY MEDICINE

## 2025-09-03 RX ORDER — PREDNISONE 1 MG/1
9 TABLET ORAL DAILY
COMMUNITY
Start: 2025-09-03